# Patient Record
Sex: FEMALE | Race: WHITE | NOT HISPANIC OR LATINO | Employment: OTHER | URBAN - METROPOLITAN AREA
[De-identification: names, ages, dates, MRNs, and addresses within clinical notes are randomized per-mention and may not be internally consistent; named-entity substitution may affect disease eponyms.]

---

## 2023-03-03 LAB — THYROTROPIN (MIU/L) IN SER/PLAS BY DETECTION LIMIT <= 0.05 MIU/L: 0.8 MIU/L (ref 0.44–3.98)

## 2023-04-24 LAB
ALBUMIN (G/DL) IN SER/PLAS: 4.4 G/DL (ref 3.4–5)
ANION GAP IN SER/PLAS: 10 MMOL/L (ref 10–20)
CALCIUM (MG/DL) IN SER/PLAS: 9.7 MG/DL (ref 8.6–10.6)
CARBON DIOXIDE, TOTAL (MMOL/L) IN SER/PLAS: 28 MMOL/L (ref 21–32)
CHLORIDE (MMOL/L) IN SER/PLAS: 106 MMOL/L (ref 98–107)
CREATININE (MG/DL) IN SER/PLAS: 1.24 MG/DL (ref 0.5–1.05)
ERYTHROCYTE DISTRIBUTION WIDTH (RATIO) BY AUTOMATED COUNT: 13.3 % (ref 11.5–14.5)
ERYTHROCYTE MEAN CORPUSCULAR HEMOGLOBIN CONCENTRATION (G/DL) BY AUTOMATED: 30.5 G/DL (ref 32–36)
ERYTHROCYTE MEAN CORPUSCULAR VOLUME (FL) BY AUTOMATED COUNT: 93 FL (ref 80–100)
ERYTHROCYTES (10*6/UL) IN BLOOD BY AUTOMATED COUNT: 4.01 X10E12/L (ref 4–5.2)
GFR FEMALE: 48 ML/MIN/1.73M2
GLUCOSE (MG/DL) IN SER/PLAS: 93 MG/DL (ref 74–99)
HEMATOCRIT (%) IN BLOOD BY AUTOMATED COUNT: 37.1 % (ref 36–46)
HEMOGLOBIN (G/DL) IN BLOOD: 11.3 G/DL (ref 12–16)
LEUKOCYTES (10*3/UL) IN BLOOD BY AUTOMATED COUNT: 5.4 X10E9/L (ref 4.4–11.3)
NRBC (PER 100 WBCS) BY AUTOMATED COUNT: 0 /100 WBC (ref 0–0)
PHOSPHATE (MG/DL) IN SER/PLAS: 3.2 MG/DL (ref 2.5–4.9)
PLATELETS (10*3/UL) IN BLOOD AUTOMATED COUNT: 79 X10E9/L (ref 150–450)
POTASSIUM (MMOL/L) IN SER/PLAS: 5 MMOL/L (ref 3.5–5.3)
SODIUM (MMOL/L) IN SER/PLAS: 139 MMOL/L (ref 136–145)
UREA NITROGEN (MG/DL) IN SER/PLAS: 21 MG/DL (ref 6–23)

## 2023-04-25 LAB — PARATHYRIN INTACT (PG/ML) IN SER/PLAS: 81.6 PG/ML (ref 18.5–88)

## 2023-07-31 LAB
ANION GAP IN SER/PLAS: 11 MMOL/L (ref 10–20)
CALCIUM (MG/DL) IN SER/PLAS: 9.9 MG/DL (ref 8.6–10.6)
CARBON DIOXIDE, TOTAL (MMOL/L) IN SER/PLAS: 28 MMOL/L (ref 21–32)
CHLORIDE (MMOL/L) IN SER/PLAS: 107 MMOL/L (ref 98–107)
CREATININE (MG/DL) IN SER/PLAS: 1.39 MG/DL (ref 0.5–1.05)
GFR FEMALE: 41 ML/MIN/1.73M2
GLUCOSE (MG/DL) IN SER/PLAS: 112 MG/DL (ref 74–99)
POTASSIUM (MMOL/L) IN SER/PLAS: 5.1 MMOL/L (ref 3.5–5.3)
SODIUM (MMOL/L) IN SER/PLAS: 141 MMOL/L (ref 136–145)
UREA NITROGEN (MG/DL) IN SER/PLAS: 21 MG/DL (ref 6–23)

## 2023-09-14 LAB
ALBUMIN (G/DL) IN SER/PLAS: 4.5 G/DL (ref 3.4–5)
ANION GAP IN SER/PLAS: 12 MMOL/L (ref 10–20)
CALCIUM (MG/DL) IN SER/PLAS: 10.3 MG/DL (ref 8.6–10.6)
CARBON DIOXIDE, TOTAL (MMOL/L) IN SER/PLAS: 26 MMOL/L (ref 21–32)
CHLORIDE (MMOL/L) IN SER/PLAS: 108 MMOL/L (ref 98–107)
CREATININE (MG/DL) IN SER/PLAS: 1.26 MG/DL (ref 0.5–1.05)
ERYTHROCYTE DISTRIBUTION WIDTH (RATIO) BY AUTOMATED COUNT: 13.9 % (ref 11.5–14.5)
ERYTHROCYTE MEAN CORPUSCULAR HEMOGLOBIN CONCENTRATION (G/DL) BY AUTOMATED: 31.7 G/DL (ref 32–36)
ERYTHROCYTE MEAN CORPUSCULAR VOLUME (FL) BY AUTOMATED COUNT: 91 FL (ref 80–100)
ERYTHROCYTES (10*6/UL) IN BLOOD BY AUTOMATED COUNT: 4.24 X10E12/L (ref 4–5.2)
GFR FEMALE: 47 ML/MIN/1.73M2
GLUCOSE (MG/DL) IN SER/PLAS: 92 MG/DL (ref 74–99)
HEMATOCRIT (%) IN BLOOD BY AUTOMATED COUNT: 38.5 % (ref 36–46)
HEMOGLOBIN (G/DL) IN BLOOD: 12.2 G/DL (ref 12–16)
LEUKOCYTES (10*3/UL) IN BLOOD BY AUTOMATED COUNT: 5.2 X10E9/L (ref 4.4–11.3)
NRBC (PER 100 WBCS) BY AUTOMATED COUNT: 0 /100 WBC (ref 0–0)
PARATHYRIN INTACT (PG/ML) IN SER/PLAS: 98.2 PG/ML (ref 18.5–88)
PHOSPHATE (MG/DL) IN SER/PLAS: 3.3 MG/DL (ref 2.5–4.9)
PLATELETS (10*3/UL) IN BLOOD AUTOMATED COUNT: 79 X10E9/L (ref 150–450)
POTASSIUM (MMOL/L) IN SER/PLAS: 4.9 MMOL/L (ref 3.5–5.3)
SODIUM (MMOL/L) IN SER/PLAS: 141 MMOL/L (ref 136–145)
UREA NITROGEN (MG/DL) IN SER/PLAS: 21 MG/DL (ref 6–23)

## 2023-09-20 ENCOUNTER — HOSPITAL ENCOUNTER (OUTPATIENT)
Dept: DATA CONVERSION | Facility: HOSPITAL | Age: 68
Discharge: HOME | End: 2023-09-20
Payer: MEDICARE

## 2023-09-20 DIAGNOSIS — N20.0 CALCULUS OF KIDNEY: ICD-10-CM

## 2023-09-21 PROBLEM — I10 PRIMARY HYPERTENSION: Status: ACTIVE | Noted: 2023-09-21

## 2023-09-21 PROBLEM — J96.00 ACUTE RESPIRATORY FAILURE (MULTI): Status: ACTIVE | Noted: 2023-09-21

## 2023-09-21 PROBLEM — E78.5 HYPERLIPIDEMIA: Status: ACTIVE | Noted: 2023-09-21

## 2023-09-21 PROBLEM — K74.60 CIRRHOSIS OF LIVER (MULTI): Status: ACTIVE | Noted: 2023-09-21

## 2023-09-21 PROBLEM — N17.9 ACUTE RENAL FAILURE SYNDROME (CMS-HCC): Status: ACTIVE | Noted: 2023-09-21

## 2023-09-21 PROBLEM — H02.834 DERMATOCHALASIS OF LEFT UPPER EYELID: Status: ACTIVE | Noted: 2023-09-21

## 2023-09-21 PROBLEM — R10.11 RIGHT UPPER QUADRANT PAIN: Status: ACTIVE | Noted: 2023-09-21

## 2023-09-21 PROBLEM — M70.61 GREATER TROCHANTERIC BURSITIS OF RIGHT HIP: Status: ACTIVE | Noted: 2023-09-21

## 2023-09-21 PROBLEM — E11.42 DIABETIC POLYNEUROPATHY ASSOCIATED WITH TYPE 2 DIABETES MELLITUS (MULTI): Status: ACTIVE | Noted: 2023-09-21

## 2023-09-21 PROBLEM — Z96.1 ARTIFICIAL LENS PRESENT: Status: ACTIVE | Noted: 2023-09-21

## 2023-09-21 PROBLEM — M54.41 LUMBAGO WITH SCIATICA, RIGHT SIDE: Status: ACTIVE | Noted: 2023-09-21

## 2023-09-21 PROBLEM — G47.33 OBSTRUCTIVE SLEEP APNEA SYNDROME: Status: ACTIVE | Noted: 2023-09-21

## 2023-09-21 PROBLEM — R07.9 CHEST PAIN: Status: ACTIVE | Noted: 2023-09-21

## 2023-09-21 PROBLEM — M51.369 DDD (DEGENERATIVE DISC DISEASE), LUMBAR: Status: ACTIVE | Noted: 2023-09-21

## 2023-09-21 PROBLEM — J18.9 PNEUMONIA: Status: ACTIVE | Noted: 2023-09-21

## 2023-09-21 PROBLEM — G89.29 OTHER CHRONIC PAIN: Status: ACTIVE | Noted: 2023-09-21

## 2023-09-21 PROBLEM — R16.0 HEPATOMEGALY: Status: ACTIVE | Noted: 2023-09-21

## 2023-09-21 PROBLEM — D64.9 ABSOLUTE ANEMIA: Status: ACTIVE | Noted: 2023-09-21

## 2023-09-21 PROBLEM — E11.9 ABNORMAL METABOLIC STATE DUE TO DIABETES MELLITUS (MULTI): Status: ACTIVE | Noted: 2023-09-21

## 2023-09-21 PROBLEM — J90 BILATERAL PLEURAL EFFUSION: Status: ACTIVE | Noted: 2023-09-21

## 2023-09-21 PROBLEM — R87.810 CERVICAL HIGH RISK HUMAN PAPILLOMAVIRUS (HPV) DNA TEST POSITIVE: Status: ACTIVE | Noted: 2023-09-21

## 2023-09-21 PROBLEM — G47.34 NOCTURNAL HYPOXIA: Status: ACTIVE | Noted: 2023-09-21

## 2023-09-21 PROBLEM — R79.89 ABNORMAL LFTS: Status: ACTIVE | Noted: 2023-09-21

## 2023-09-21 PROBLEM — E87.5 HYPERKALEMIA: Status: ACTIVE | Noted: 2023-09-21

## 2023-09-21 PROBLEM — J86.9: Status: ACTIVE | Noted: 2023-09-21

## 2023-09-21 PROBLEM — R06.09 DYSPNEA ON MINIMAL EXERTION: Status: ACTIVE | Noted: 2023-09-21

## 2023-09-21 PROBLEM — K81.1 CHRONIC CHOLECYSTITIS: Status: ACTIVE | Noted: 2023-09-21

## 2023-09-21 PROBLEM — M51.36 DDD (DEGENERATIVE DISC DISEASE), LUMBAR: Status: ACTIVE | Noted: 2023-09-21

## 2023-09-21 PROBLEM — A41.9 SEPSIS (MULTI): Status: ACTIVE | Noted: 2023-09-21

## 2023-09-21 PROBLEM — K11.5 CALCULUS OF PAROTID GLAND: Status: ACTIVE | Noted: 2023-09-21

## 2023-09-21 PROBLEM — E11.40 TYPE 2 DIABETES MELLITUS WITH DIABETIC NEUROPATHY, UNSPECIFIED (MULTI): Status: ACTIVE | Noted: 2023-09-21

## 2023-09-21 PROBLEM — E87.6 HYPOKALEMIA: Status: ACTIVE | Noted: 2023-09-21

## 2023-09-21 PROBLEM — I69.398 OTHER SEQUELAE OF CEREBRAL INFARCTION: Status: ACTIVE | Noted: 2023-09-21

## 2023-09-21 PROBLEM — M54.16 RADICULOPATHY, LUMBAR REGION: Status: ACTIVE | Noted: 2023-09-21

## 2023-09-21 PROBLEM — R20.2 PARESTHESIA: Status: ACTIVE | Noted: 2023-09-21

## 2023-09-21 PROBLEM — Z91.89 AT RISK FOR INJURY RELATED TO RESTRAINTS: Status: ACTIVE | Noted: 2023-09-21

## 2023-09-21 PROBLEM — H02.831 DERMATOCHALASIS OF RIGHT UPPER EYELID: Status: ACTIVE | Noted: 2023-09-21

## 2023-09-21 PROBLEM — E11.9 DIABETES MELLITUS (MULTI): Status: ACTIVE | Noted: 2023-09-21

## 2023-09-21 PROBLEM — R77.9 ELEVATED BLOOD PROTEIN: Status: ACTIVE | Noted: 2023-09-21

## 2023-09-21 PROBLEM — R14.0 ABDOMINAL DISTENTION: Status: ACTIVE | Noted: 2023-09-21

## 2023-09-21 PROBLEM — D47.3 ESSENTIAL THROMBOCYTHEMIA (MULTI): Status: ACTIVE | Noted: 2023-09-21

## 2023-09-21 PROBLEM — M70.71 ISCHIAL BURSITIS OF RIGHT SIDE: Status: ACTIVE | Noted: 2023-09-21

## 2023-09-21 PROBLEM — E03.9 HYPOTHYROIDISM: Status: ACTIVE | Noted: 2023-09-21

## 2023-09-21 PROBLEM — Z78.9 MEDICAL HOME PATIENT: Status: ACTIVE | Noted: 2023-09-21

## 2023-09-21 PROBLEM — G57.11 MERALGIA PARESTHETICA OF RIGHT SIDE: Status: ACTIVE | Noted: 2023-09-21

## 2023-09-21 PROBLEM — R21 MACULOPAPULAR RASH: Status: ACTIVE | Noted: 2023-09-21

## 2023-09-21 PROBLEM — E87.20 LACTIC ACIDOSIS: Status: ACTIVE | Noted: 2023-09-21

## 2023-09-21 PROBLEM — E83.52 HYPERCALCEMIA: Status: ACTIVE | Noted: 2023-09-21

## 2023-09-21 PROBLEM — R40.1 CLOUDED CONSCIOUSNESS: Status: ACTIVE | Noted: 2023-09-21

## 2023-09-21 PROBLEM — G62.9 NEUROPATHY: Status: ACTIVE | Noted: 2023-09-21

## 2023-09-21 PROBLEM — M79.18 MYOFASCIAL PAIN: Status: ACTIVE | Noted: 2023-09-21

## 2023-09-21 PROBLEM — I85.00 ESOPHAGEAL VARICES (MULTI): Status: ACTIVE | Noted: 2023-09-21

## 2023-09-21 PROBLEM — F17.211 CIGARETTE NICOTINE DEPENDENCE IN REMISSION: Status: ACTIVE | Noted: 2023-09-21

## 2023-09-21 PROBLEM — G47.00 INSOMNIA: Status: ACTIVE | Noted: 2023-09-21

## 2023-09-21 PROBLEM — R19.7 DIARRHEA: Status: ACTIVE | Noted: 2023-09-21

## 2023-09-21 PROBLEM — G43.109 BASILAR MIGRAINE: Status: ACTIVE | Noted: 2023-09-21

## 2023-09-21 PROBLEM — E87.20 ACIDOSIS: Status: ACTIVE | Noted: 2023-09-21

## 2023-09-21 PROBLEM — E87.0 HYPERNATREMIA: Status: ACTIVE | Noted: 2023-09-21

## 2023-09-21 PROBLEM — K76.0 NAFLD (NONALCOHOLIC FATTY LIVER DISEASE): Status: ACTIVE | Noted: 2023-09-21

## 2023-09-21 PROBLEM — E55.9 VITAMIN D DEFICIENCY: Status: ACTIVE | Noted: 2023-09-21

## 2023-09-21 PROBLEM — D69.6 LOW PLATELET COUNT (CMS-HCC): Status: ACTIVE | Noted: 2023-09-21

## 2023-09-21 PROBLEM — H35.3190 NONEXUDATIVE AGE-RELATED MACULAR DEGENERATION: Status: ACTIVE | Noted: 2023-09-21

## 2023-09-21 RX ORDER — LOSARTAN POTASSIUM 100 MG/1
100 TABLET ORAL DAILY
COMMUNITY
End: 2023-10-19

## 2023-09-21 RX ORDER — LEVOTHYROXINE SODIUM 175 UG/1
1 TABLET ORAL DAILY
COMMUNITY
End: 2024-05-21 | Stop reason: DRUGHIGH

## 2023-09-21 RX ORDER — METFORMIN HYDROCHLORIDE 500 MG/1
500 TABLET, EXTENDED RELEASE ORAL
COMMUNITY
Start: 2022-05-02 | End: 2024-02-23 | Stop reason: SDUPTHER

## 2023-09-21 RX ORDER — DEXTROMETHORPHAN HYDROBROMIDE, GUAIFENESIN 5; 100 MG/5ML; MG/5ML
1300 LIQUID ORAL EVERY 8 HOURS PRN
COMMUNITY
End: 2024-02-23 | Stop reason: WASHOUT

## 2023-09-21 RX ORDER — CALCIUM CARBONATE/VITAMIN D3 600 MG-10
TABLET ORAL
COMMUNITY
Start: 2021-10-21

## 2023-09-21 RX ORDER — BLOOD-GLUCOSE METER
KIT MISCELLANEOUS
COMMUNITY
Start: 2023-07-28

## 2023-09-21 RX ORDER — LEVOTHYROXINE SODIUM 112 UG/1
112 TABLET ORAL
COMMUNITY
End: 2024-02-23 | Stop reason: WASHOUT

## 2023-09-21 RX ORDER — LEVOTHYROXINE SODIUM 125 UG/1
TABLET ORAL
COMMUNITY
Start: 2022-12-25 | End: 2024-02-23 | Stop reason: WASHOUT

## 2023-09-21 RX ORDER — ZINC GLUCONATE 50 MG
50 TABLET ORAL DAILY
COMMUNITY

## 2023-09-21 RX ORDER — FLUTICASONE FUROATE, UMECLIDINIUM BROMIDE AND VILANTEROL TRIFENATATE 100; 62.5; 25 UG/1; UG/1; UG/1
1 POWDER RESPIRATORY (INHALATION) DAILY
COMMUNITY
Start: 2022-12-08 | End: 2024-05-14 | Stop reason: WASHOUT

## 2023-09-21 RX ORDER — ZINC SULFATE 50(220)MG
50 CAPSULE ORAL
COMMUNITY
Start: 2022-01-27

## 2023-09-21 RX ORDER — ACETAMINOPHEN 500 MG
50 TABLET ORAL DAILY
COMMUNITY

## 2023-09-21 RX ORDER — VIT C/E/ZN/COPPR/LUTEIN/ZEAXAN 250MG-90MG
CAPSULE ORAL
COMMUNITY
End: 2024-05-14 | Stop reason: WASHOUT

## 2023-09-21 RX ORDER — ACETAMINOPHEN 500 MG
500 TABLET ORAL EVERY 6 HOURS PRN
COMMUNITY

## 2023-09-21 RX ORDER — ACETAMINOPHEN 325 MG/1
650 TABLET ORAL EVERY 4 HOURS PRN
COMMUNITY
End: 2024-02-23 | Stop reason: WASHOUT

## 2023-09-21 RX ORDER — LINAGLIPTIN 5 MG/1
5 TABLET, FILM COATED ORAL DAILY
COMMUNITY

## 2023-09-21 RX ORDER — PANTOPRAZOLE SODIUM 40 MG/1
1 TABLET, DELAYED RELEASE ORAL DAILY
COMMUNITY
Start: 2021-11-19 | End: 2024-05-14 | Stop reason: WASHOUT

## 2023-09-21 RX ORDER — GABAPENTIN 400 MG/1
400 CAPSULE ORAL 3 TIMES DAILY
COMMUNITY

## 2023-09-21 RX ORDER — LOSARTAN POTASSIUM 50 MG/1
50 TABLET ORAL DAILY
COMMUNITY
Start: 2021-03-23 | End: 2023-10-19 | Stop reason: SDUPTHER

## 2023-09-21 RX ORDER — NADOLOL 40 MG/1
40 TABLET ORAL DAILY
COMMUNITY
Start: 2018-05-17 | End: 2024-02-23 | Stop reason: SDUPTHER

## 2023-09-21 RX ORDER — LEVOTHYROXINE SODIUM 137 UG/1
137 TABLET ORAL
COMMUNITY
End: 2024-02-15

## 2023-09-21 RX ORDER — ALBUTEROL SULFATE 90 UG/1
1 AEROSOL, METERED RESPIRATORY (INHALATION) EVERY 4 HOURS PRN
COMMUNITY
Start: 2021-11-01 | End: 2024-04-23 | Stop reason: SDUPTHER

## 2023-09-21 RX ORDER — LANOLIN ALCOHOL/MO/W.PET/CERES
1000 CREAM (GRAM) TOPICAL DAILY
COMMUNITY

## 2023-09-21 RX ORDER — METFORMIN HYDROCHLORIDE 1000 MG/1
1 TABLET ORAL EVERY 12 HOURS
COMMUNITY
End: 2024-02-23 | Stop reason: WASHOUT

## 2023-09-21 RX ORDER — ATORVASTATIN CALCIUM 40 MG/1
40 TABLET, FILM COATED ORAL DAILY
COMMUNITY
End: 2024-02-23 | Stop reason: SDUPTHER

## 2023-09-21 RX ORDER — ATORVASTATIN CALCIUM 20 MG/1
20 TABLET, FILM COATED ORAL DAILY
COMMUNITY
End: 2024-02-23 | Stop reason: ALTCHOICE

## 2023-09-29 ENCOUNTER — APPOINTMENT (OUTPATIENT)
Dept: OPHTHALMOLOGY | Facility: CLINIC | Age: 68
End: 2023-09-29
Payer: MEDICARE

## 2023-10-19 DIAGNOSIS — I10 PRIMARY HYPERTENSION: ICD-10-CM

## 2023-10-19 RX ORDER — LOSARTAN POTASSIUM 100 MG/1
100 TABLET ORAL DAILY
Qty: 90 TABLET | Refills: 1 | Status: SHIPPED | OUTPATIENT
Start: 2023-10-19 | End: 2024-05-01

## 2024-02-15 DIAGNOSIS — Z76.0 PRESCRIPTION REFILL: ICD-10-CM

## 2024-02-15 RX ORDER — LEVOTHYROXINE SODIUM 137 UG/1
137 TABLET ORAL
Qty: 90 TABLET | Refills: 1 | Status: SHIPPED | OUTPATIENT
Start: 2024-02-15 | End: 2024-05-14 | Stop reason: WASHOUT

## 2024-02-23 DIAGNOSIS — Z76.0 MEDICATION REFILL: ICD-10-CM

## 2024-02-23 RX ORDER — ATORVASTATIN CALCIUM 40 MG/1
40 TABLET, FILM COATED ORAL DAILY
Qty: 90 TABLET | Refills: 1 | Status: SHIPPED | OUTPATIENT
Start: 2024-02-23

## 2024-02-23 RX ORDER — METFORMIN HYDROCHLORIDE 500 MG/1
500 TABLET, EXTENDED RELEASE ORAL
Qty: 180 TABLET | Refills: 1 | Status: SHIPPED | OUTPATIENT
Start: 2024-02-23

## 2024-02-23 RX ORDER — NADOLOL 40 MG/1
40 TABLET ORAL DAILY
Qty: 90 TABLET | Refills: 1 | Status: SHIPPED | OUTPATIENT
Start: 2024-02-23

## 2024-03-15 ENCOUNTER — LAB (OUTPATIENT)
Dept: LAB | Facility: LAB | Age: 69
End: 2024-03-15
Payer: MEDICARE

## 2024-03-15 DIAGNOSIS — N18.30 CHRONIC KIDNEY DISEASE, STAGE 3 UNSPECIFIED (MULTI): Primary | ICD-10-CM

## 2024-03-15 LAB
ALBUMIN SERPL BCP-MCNC: 4.4 G/DL (ref 3.4–5)
ANION GAP SERPL CALC-SCNC: 12 MMOL/L (ref 10–20)
BUN SERPL-MCNC: 22 MG/DL (ref 6–23)
CALCIUM SERPL-MCNC: 9.2 MG/DL (ref 8.6–10.6)
CHLORIDE SERPL-SCNC: 104 MMOL/L (ref 98–107)
CO2 SERPL-SCNC: 27 MMOL/L (ref 21–32)
CREAT SERPL-MCNC: 1.38 MG/DL (ref 0.5–1.05)
EGFRCR SERPLBLD CKD-EPI 2021: 42 ML/MIN/1.73M*2
ERYTHROCYTE [DISTWIDTH] IN BLOOD BY AUTOMATED COUNT: 14.1 % (ref 11.5–14.5)
GLUCOSE SERPL-MCNC: 297 MG/DL (ref 74–99)
HCT VFR BLD AUTO: 35.4 % (ref 36–46)
HGB BLD-MCNC: 11 G/DL (ref 12–16)
MCH RBC QN AUTO: 29.2 PG (ref 26–34)
MCHC RBC AUTO-ENTMCNC: 31.1 G/DL (ref 32–36)
MCV RBC AUTO: 94 FL (ref 80–100)
NRBC BLD-RTO: 0 /100 WBCS (ref 0–0)
PHOSPHATE SERPL-MCNC: 2.3 MG/DL (ref 2.5–4.9)
PLATELET # BLD AUTO: 63 X10*3/UL (ref 150–450)
POTASSIUM SERPL-SCNC: 4.9 MMOL/L (ref 3.5–5.3)
PTH-INTACT SERPL-MCNC: 265.5 PG/ML (ref 18.5–88)
RBC # BLD AUTO: 3.77 X10*6/UL (ref 4–5.2)
SODIUM SERPL-SCNC: 138 MMOL/L (ref 136–145)
WBC # BLD AUTO: 4.2 X10*3/UL (ref 4.4–11.3)

## 2024-03-15 PROCEDURE — 36415 COLL VENOUS BLD VENIPUNCTURE: CPT

## 2024-03-15 PROCEDURE — 85027 COMPLETE CBC AUTOMATED: CPT

## 2024-03-15 PROCEDURE — 83970 ASSAY OF PARATHORMONE: CPT

## 2024-03-15 PROCEDURE — 80069 RENAL FUNCTION PANEL: CPT

## 2024-04-09 ENCOUNTER — HOSPITAL ENCOUNTER (OUTPATIENT)
Dept: RADIOLOGY | Facility: HOSPITAL | Age: 69
Discharge: HOME | End: 2024-04-09
Payer: MEDICARE

## 2024-04-09 DIAGNOSIS — N20.0 CALCULUS OF KIDNEY: ICD-10-CM

## 2024-04-09 PROCEDURE — 74018 RADEX ABDOMEN 1 VIEW: CPT | Performed by: RADIOLOGY

## 2024-04-09 PROCEDURE — 74018 RADEX ABDOMEN 1 VIEW: CPT

## 2024-04-23 DIAGNOSIS — Z76.0 MEDICATION REFILL: ICD-10-CM

## 2024-04-23 RX ORDER — ALBUTEROL SULFATE 90 UG/1
1 AEROSOL, METERED RESPIRATORY (INHALATION) EVERY 4 HOURS PRN
Qty: 18 G | Refills: 1 | Status: SHIPPED | OUTPATIENT
Start: 2024-04-23

## 2024-04-30 DIAGNOSIS — I10 PRIMARY HYPERTENSION: ICD-10-CM

## 2024-05-01 RX ORDER — LOSARTAN POTASSIUM 100 MG/1
100 TABLET ORAL DAILY
Qty: 90 TABLET | Refills: 0 | Status: SHIPPED | OUTPATIENT
Start: 2024-05-01

## 2024-05-14 ENCOUNTER — OFFICE VISIT (OUTPATIENT)
Dept: PRIMARY CARE | Facility: CLINIC | Age: 69
End: 2024-05-14
Payer: MEDICARE

## 2024-05-14 VITALS
SYSTOLIC BLOOD PRESSURE: 124 MMHG | OXYGEN SATURATION: 97 % | BODY MASS INDEX: 29.26 KG/M2 | TEMPERATURE: 97.7 F | DIASTOLIC BLOOD PRESSURE: 80 MMHG | HEIGHT: 62 IN | HEART RATE: 65 BPM | WEIGHT: 159 LBS

## 2024-05-14 DIAGNOSIS — Z12.31 ENCOUNTER FOR SCREENING MAMMOGRAM FOR MALIGNANT NEOPLASM OF BREAST: ICD-10-CM

## 2024-05-14 DIAGNOSIS — Z23 ENCOUNTER FOR IMMUNIZATION: ICD-10-CM

## 2024-05-14 DIAGNOSIS — Z13.6 SCREENING FOR CARDIOVASCULAR CONDITION: ICD-10-CM

## 2024-05-14 DIAGNOSIS — Z78.0 ASYMPTOMATIC MENOPAUSAL STATE: ICD-10-CM

## 2024-05-14 DIAGNOSIS — R87.610 ASCUS WITH POSITIVE HIGH RISK HPV CERVICAL: ICD-10-CM

## 2024-05-14 DIAGNOSIS — Z00.00 ANNUAL PHYSICAL EXAM: ICD-10-CM

## 2024-05-14 DIAGNOSIS — G47.00 FREQUENT NOCTURNAL AWAKENING: ICD-10-CM

## 2024-05-14 DIAGNOSIS — G47.00 INSOMNIA, UNSPECIFIED TYPE: ICD-10-CM

## 2024-05-14 DIAGNOSIS — Z00.00 MEDICARE ANNUAL WELLNESS VISIT, SUBSEQUENT: Primary | ICD-10-CM

## 2024-05-14 DIAGNOSIS — E55.9 VITAMIN D DEFICIENCY: ICD-10-CM

## 2024-05-14 DIAGNOSIS — R87.810 ASCUS WITH POSITIVE HIGH RISK HPV CERVICAL: ICD-10-CM

## 2024-05-14 DIAGNOSIS — E11.40 TYPE 2 DIABETES MELLITUS WITH DIABETIC NEUROPATHY, WITHOUT LONG-TERM CURRENT USE OF INSULIN (MULTI): ICD-10-CM

## 2024-05-14 DIAGNOSIS — G47.33 OBSTRUCTIVE SLEEP APNEA SYNDROME: ICD-10-CM

## 2024-05-14 DIAGNOSIS — Z87.891 PERSONAL HISTORY OF NICOTINE DEPENDENCE: ICD-10-CM

## 2024-05-14 DIAGNOSIS — Z12.11 ENCOUNTER FOR SCREENING FOR MALIGNANT NEOPLASM OF COLON: ICD-10-CM

## 2024-05-14 PROCEDURE — 1160F RVW MEDS BY RX/DR IN RCRD: CPT | Performed by: STUDENT IN AN ORGANIZED HEALTH CARE EDUCATION/TRAINING PROGRAM

## 2024-05-14 PROCEDURE — 1124F ACP DISCUSS-NO DSCNMKR DOCD: CPT | Performed by: STUDENT IN AN ORGANIZED HEALTH CARE EDUCATION/TRAINING PROGRAM

## 2024-05-14 PROCEDURE — 90715 TDAP VACCINE 7 YRS/> IM: CPT | Performed by: STUDENT IN AN ORGANIZED HEALTH CARE EDUCATION/TRAINING PROGRAM

## 2024-05-14 PROCEDURE — 3074F SYST BP LT 130 MM HG: CPT | Performed by: STUDENT IN AN ORGANIZED HEALTH CARE EDUCATION/TRAINING PROGRAM

## 2024-05-14 PROCEDURE — G0009 ADMIN PNEUMOCOCCAL VACCINE: HCPCS | Mod: 59 | Performed by: STUDENT IN AN ORGANIZED HEALTH CARE EDUCATION/TRAINING PROGRAM

## 2024-05-14 PROCEDURE — 3079F DIAST BP 80-89 MM HG: CPT | Performed by: STUDENT IN AN ORGANIZED HEALTH CARE EDUCATION/TRAINING PROGRAM

## 2024-05-14 PROCEDURE — 99215 OFFICE O/P EST HI 40 MIN: CPT | Performed by: STUDENT IN AN ORGANIZED HEALTH CARE EDUCATION/TRAINING PROGRAM

## 2024-05-14 PROCEDURE — 99214 OFFICE O/P EST MOD 30 MIN: CPT | Performed by: STUDENT IN AN ORGANIZED HEALTH CARE EDUCATION/TRAINING PROGRAM

## 2024-05-14 PROCEDURE — 1125F AMNT PAIN NOTED PAIN PRSNT: CPT | Performed by: STUDENT IN AN ORGANIZED HEALTH CARE EDUCATION/TRAINING PROGRAM

## 2024-05-14 PROCEDURE — 99397 PER PM REEVAL EST PAT 65+ YR: CPT | Performed by: STUDENT IN AN ORGANIZED HEALTH CARE EDUCATION/TRAINING PROGRAM

## 2024-05-14 PROCEDURE — 4010F ACE/ARB THERAPY RXD/TAKEN: CPT | Performed by: STUDENT IN AN ORGANIZED HEALTH CARE EDUCATION/TRAINING PROGRAM

## 2024-05-14 PROCEDURE — 1159F MED LIST DOCD IN RCRD: CPT | Performed by: STUDENT IN AN ORGANIZED HEALTH CARE EDUCATION/TRAINING PROGRAM

## 2024-05-14 PROCEDURE — G0439 PPPS, SUBSEQ VISIT: HCPCS | Performed by: STUDENT IN AN ORGANIZED HEALTH CARE EDUCATION/TRAINING PROGRAM

## 2024-05-14 ASSESSMENT — ANXIETY QUESTIONNAIRES
4. TROUBLE RELAXING: SEVERAL DAYS
IF YOU CHECKED OFF ANY PROBLEMS ON THIS QUESTIONNAIRE, HOW DIFFICULT HAVE THESE PROBLEMS MADE IT FOR YOU TO DO YOUR WORK, TAKE CARE OF THINGS AT HOME, OR GET ALONG WITH OTHER PEOPLE: NOT DIFFICULT AT ALL
3. WORRYING TOO MUCH ABOUT DIFFERENT THINGS: NOT AT ALL
2. NOT BEING ABLE TO STOP OR CONTROL WORRYING: NOT AT ALL
GAD7 TOTAL SCORE: 1
7. FEELING AFRAID AS IF SOMETHING AWFUL MIGHT HAPPEN: NOT AT ALL
1. FEELING NERVOUS, ANXIOUS, OR ON EDGE: NOT AT ALL
6. BECOMING EASILY ANNOYED OR IRRITABLE: NOT AT ALL
5. BEING SO RESTLESS THAT IT IS HARD TO SIT STILL: NOT AT ALL

## 2024-05-14 ASSESSMENT — PATIENT HEALTH QUESTIONNAIRE - PHQ9
9. THOUGHTS THAT YOU WOULD BE BETTER OFF DEAD, OR OF HURTING YOURSELF: NOT AT ALL
10. IF YOU CHECKED OFF ANY PROBLEMS, HOW DIFFICULT HAVE THESE PROBLEMS MADE IT FOR YOU TO DO YOUR WORK, TAKE CARE OF THINGS AT HOME, OR GET ALONG WITH OTHER PEOPLE: NOT DIFFICULT AT ALL
SUM OF ALL RESPONSES TO PHQ QUESTIONS 1-9: 8
5. POOR APPETITE OR OVEREATING: NOT AT ALL
SUM OF ALL RESPONSES TO PHQ9 QUESTIONS 1 AND 2: 0
1. LITTLE INTEREST OR PLEASURE IN DOING THINGS: NOT AT ALL
3. TROUBLE FALLING OR STAYING ASLEEP OR SLEEPING TOO MUCH: NEARLY EVERY DAY
6. FEELING BAD ABOUT YOURSELF - OR THAT YOU ARE A FAILURE OR HAVE LET YOURSELF OR YOUR FAMILY DOWN: NOT AT ALL
7. TROUBLE CONCENTRATING ON THINGS, SUCH AS READING THE NEWSPAPER OR WATCHING TELEVISION: SEVERAL DAYS
8. MOVING OR SPEAKING SO SLOWLY THAT OTHER PEOPLE COULD HAVE NOTICED. OR THE OPPOSITE, BEING SO FIGETY OR RESTLESS THAT YOU HAVE BEEN MOVING AROUND A LOT MORE THAN USUAL: SEVERAL DAYS
4. FEELING TIRED OR HAVING LITTLE ENERGY: NEARLY EVERY DAY
2. FEELING DOWN, DEPRESSED OR HOPELESS: NOT AT ALL

## 2024-05-14 ASSESSMENT — PAIN SCALES - GENERAL: PAINLEVEL: 2

## 2024-05-14 NOTE — PATIENT INSTRUCTIONS
Thank you for coming to see me today.    Pneumonia and tetanus vaccinations in clinic today.    Go to the lab for fasting blood work, we will call you with all results.    Multiple imaging studies ordered today: Mammogram, DEXA scan, CT lung screening-call to schedule.    Sleep medicine referral entered for reassessment of your known sleep apnea, call to schedule.  Try taking melatonin 4 hours before planned bedtime as we discussed.    GI referral for Dr. Villegas entered today for repeat colonoscopy, call to schedule.    Follow-up with me in 3 months for diabetes check, sooner if needed.

## 2024-05-14 NOTE — PROGRESS NOTES
MEDICARE WELLNESS    Chief Complaint   Patient presents with    Annual Exam       HPI:  Kisha Vincent is a 68 y.o. female here  for a a Medicare Wellness Visit.    Hypothyroidism:    Lab Results   Component Value Date    TSH 1.12 07/21/2023         DM2:  Metformin 500 mg BID.  No missed dosages  Statin: atorvastatin 40 mg daily  Lab Results   Component Value Date    HGBA1C 5.7 11/06/2021     JAYNE:  Last sleep study over 10 years ago.  Was on machine and choking due to the night.  Difficulty falling asleep  Has tried OTC melatonin and other sleep    Former smoker  1ppd+ for over 40 years    Health Maintenance    Other Healthcare Providers:  Nephrology:  Magan Kendrick MD  Ophthalmology:  Gualberto Gra MD    Social History:  Tobacco:  quit 2013, 1 ppd for 40 yrs  EtOH:  no  Patient reports routine vision checks and dental cleanings, and regular exercise.     Advanced Directives:  Patient DOES NOT have advanced directives in place.  Counseled and discussed importance with patient during visit today.  Provided assistance as necessary.    Opioid Medications:  Does the patient use opioid medications: NO  Name of medication: N/A  If yes, do they take this medicine appropriately: N/A    Overall Health:  How does the patient rate their health status today:  GOOD    Cognitive Screen:  AAAx3  to person, place and time: YES  3 word recall: Banana, Chair, Sunrise  - Immediate recall: YES  - 5 minutes recall: YES  Impression: No cognitive deficiency observed during screening or encounter today     Vision/Hearing Screen:  Vision :  No acute concerns  Hearing screen: reports no difficulty with hearing and passes finger rub test bilaterally    Immunizations:  COVID:  DUE  Flu:  Due next flu season  Tdap: DUE  Pneumonia:  DUE  Shingrix:  DUE    Immunization History   Administered Date(s) Administered    Flu vaccine, quadrivalent, high-dose, preservative free, age 65y+ (FLUZONE) 10/16/2020, 11/21/2022    Influenza, Unspecified  11/21/2022    Pneumococcal conjugate vaccine, 20-valent (PREVNAR 20) 05/14/2024    Tdap vaccine, age 7 year and older (BOOSTRIX, ADACEL) 05/14/2024       Screenings:  HCV:  negative 2/15/2018 - utd  Pap:  10/25/2019 ASCUS, HPV positive, will schedule appt with me  Mammogram:  2/4/2023 wnl - DUE  Colonoscopy:  1/17/2018 ([facility]) - 3 broad based sessile polyps (path:  tubular adenoma x2, hyperplastic polyp x1) - DUE  DEXA:  DUE    Screening Pap due 21-65, Q3, Q5 with nml HPV after 31 y/o  Screening Mammogram :  yearly ages 40-75, shared decision making age >75  Screening Colonoscopy:  age 45-75, shared decision making age >75  DEXA scan 65 or 60 with risks, [id number] after  AAA screen men 65-75 men with ANY smoking history  Low dose CT of lungs:  yearly for 50-80 with at least 20 year pack history.  Current smokers and those who quit <15 years ago.  Coronary Ca score men >45, women >55,   Hep C screen:  one time all adults age 18-79        Reviewed:   Past Medical History/Allergies:  Yes  Family History:  Yes  Social History:  Yes  Current Medications:  Yes  Vital Signs:  Yes  Advanced Directives:  discussed  Immunizations:  reviewed today  Home Safety:                    Up & Go test > 30 seconds?  No                   Home have rugs; lack grab bars in bathroom; lack handrail on stairs; have poor lighting?  No                   Hearing difficulties?  No  Geriatric Assessment           ADL areas requiring assistance:  Does not need help with , Dressing, Eating, Ambulating, Toileting, Grooming, Hygiene.            IADL areas requiring assistance:  Does not need help with , Shopping, Housework, Accounting, Transportation, Driving.   Medications reviewed           Current supplements  Reviewed and recorded.   Other providers           Reviewed and recorded  Current providers and suppliers:     PHQ9/GAD7:  Over the past 2 weeks, how often have you been bothered by any of the following problems?  Trouble falling or staying  asleep, or sleeping too much: Nearly every day  Feeling tired or having little energy: Nearly every day  Poor appetite or overeating: Not at all  Feeling bad about yourself - or that you are a failure or have let yourself or your family down: Not at all  Trouble concentrating on things, such as reading the newspaper or watching television: Several days  Moving or speaking so slowly that other people could have noticed? Or the opposite - being so fidgety or restless that you have been moving around a lot more than usual.: Several days  Thoughts that you would be better off dead or hurting yourself in some way: Not at all  Patient Health Questionnaire-9 Score: 8  Over the last 2 weeks, how often have you been bothered by any of the following problems?  Feeling nervous, anxious, or on edge: Not at all  Not being able to stop or control worrying: Not at all  Worrying too much about different things: Not at all  Trouble relaxing: Several days  Being so restless that it is hard to sit still: Not at all  Becoming easily annoyed or irritable: Not at all  Feeling afraid as if something awful might happen: Not at all  RUDDY-7 Total Score: 1      Current Medications  Current Outpatient Medications   Medication Instructions    acetaminophen (TYLENOL) 500 mg, oral, Every 6 hours PRN    albuterol 90 mcg/actuation inhaler 1 puff, inhalation, Every 4 hours PRN    atorvastatin (LIPITOR) 40 mg, oral, Daily    cholecalciferol (VITAMIN D-3) 50 mcg, oral, Daily    cyanocobalamin (VITAMIN B-12) 1,000 mcg, oral, Daily    FreeStyle Lite Strips strip FREESTYLE LT STRIPS HEATHER    gabapentin (NEURONTIN) 400 mg, oral, 3 times daily    levothyroxine (Synthroid) 175 mcg tablet 1 tablet, oral, Daily    losartan (COZAAR) 100 mg, oral, Daily    metFORMIN XR (GLUCOPHAGE-XR) 500 mg, oral, 2 times daily (morning and late afternoon)    nadolol (CORGARD) 40 mg, oral, Daily    NON FORMULARY CHLORZAOXADONE MUSCLE RELAXER    omega-3 fatty acids-fish oil  (One-Per-Day Omega-3) 684-1,200 mg capsule oral,  Omega 3 1200 MG Oral Capsule    Tradjenta 5 mg, oral, Daily    zinc gluconate 50 mg tablet 50 mg of elemental zinc, oral, Daily    zinc sulfate (Zincate) 220 (50 Zn) MG capsule 50 mg of elemental zinc, oral,  Zinc 220 (50 Zn) MG Oral Capsule        History  Allergies   Allergen Reactions    Latex, Natural Rubber Other and Unknown     sensitivity    Amoxicillin Rash    Ampicillin Hives and Rash    Cefazolin Hives and Rash      Past Medical History:   Diagnosis Date    Abdominal distension (gaseous) 2020    Abdominal distention    Osteopetrosis (HHS-HCC)     Osteopetrosis    Personal history of other diseases of the nervous system and sense organs     History of neuropathy    Personal history of other diseases of the nervous system and sense organs     History of sleep apnea    Personal history of other endocrine, nutritional and metabolic disease     History of hypothyroidism    Personal history of other endocrine, nutritional and metabolic disease     History of type 2 diabetes mellitus    Personal history of other infectious and parasitic diseases     History of sepsis    Thrombocytopenia, unspecified (CMS-HCC)     Temporary low platelet count      Past Surgical History:   Procedure Laterality Date    CATARACT EXTRACTION  02/15/2018    Cataract Surgery     SECTION, CLASSIC  02/15/2018     Section    OTHER SURGICAL HISTORY  2020    Cervical biopsy procedure colposcopic    TUBAL LIGATION  02/15/2018    Tubal Ligation    US GUIDED PERCUTANEOUS PLACEMENT  8/10/2021    US GUIDED PERCUTANEOUS PLACEMENT [facility] CLINICAL LEGAC[facility]     Family History   Problem Relation Name Age of Onset    Diabetes Mother      Breast cancer Mother  80    Hypertension Father      Lung cancer Father      Thyroid disease Maternal Grandmother      Diabetes Paternal Grandmother      Breast cancer Paternal Grandmother  60     Social History     Tobacco Use    Smoking status:  Former     Types: Cigarettes    Tobacco comments:     Quit 2013, over 1 ppd for 40 years   Substance Use Topics    Alcohol use: Never     Tobacco Use: Medium Risk (5/14/2024)    Patient History     Smoking Tobacco Use: Former     Smokeless Tobacco Use: Unknown     Passive Exposure: Not on file        ROS  All pertinent positive symptoms are included in the history of present illness.  All other systems have been reviewed and are negative and noncontributory to this patient's current ailments.    VITAL SIGNS  Vitals:    05/14/24 1531   BP: 124/80   Pulse: 65   Temp: 36.5 °C (97.7 °F)   SpO2: 97%     Vitals:    05/14/24 1531   Weight: 72.1 kg (159 lb)      Body mass index is 29.08 kg/m².     PHYSICAL EXAM    GENERAL  Well-appearing, pleasant and cooperative.  No acute distress.    HEENT  HEAD:   Normocephalic.  Atraumatic.  EYES:  PERRLA.  No scleral icterus or conjunctival injection.  EARS:  Tympanic membranes visualized bilaterally without erythema, fluid, or bulging.  NECK:  No adenopathy.  No palpable thyroid enlargement or nodules.    THROAT:  Moist oropharynx without tonsillar enlargement or exudates.    LUNGS:    Clear to auscultation bilaterally.  No wheezes, rales, rhonchi.    CARDIAC:  Regular rate and rhythm.  Normal S1S2.  No murmurs/rubs/gallops.    ABDOMEN:  Soft, non-tender, non-distended.  No hepatosplenomegaly.  Normoactive bowel sounds.    MUSCULOSKELETAL:  No gross abnormalities.   No joint swelling or erythema,.  No spinal or paraspinal tenderness to palpation.    EXTREMITIES:  No LE edema or cyanosis.      NEURO           Alert and oriented x3. No focal deficits.    PSYCH:          Affect appropriate.     Preventative Services reviewed with patient, instructions provided    Assessment/Plan   Problem List Items Addressed This Visit       Insomnia    Relevant Orders    Referral to Adult Sleep Medicine    Obstructive sleep apnea syndrome    Relevant Orders    Referral to Adult Sleep Medicine    Type  2 diabetes mellitus with diabetic neuropathy, unspecified (Multi)    Relevant Orders    CBC    Hemoglobin A1c    Vitamin B12    Albumin , Urine Random    Vitamin D deficiency    Relevant Orders    Vitamin D 25-Hydroxy,Total (for eval of Vitamin D levels)     Other Visit Diagnoses       Medicare annual wellness visit, subsequent    -  Primary    Annual physical exam        Relevant Orders    Lipid Panel    TSH with reflex to Free T4 if abnormal    Comprehensive Metabolic Panel    CBC    Screening for cardiovascular condition        Relevant Orders    Lipid Panel    Encounter for screening mammogram for malignant neoplasm of breast        Asymptomatic menopausal state        Relevant Orders    XR DEXA bone density    ASCUS with positive high risk HPV cervical        Frequent nocturnal awakening        Relevant Orders    Referral to Adult Sleep Medicine    Encounter for screening for malignant neoplasm of colon        Relevant Orders    Referral to Gastroenterology    Personal history of nicotine dependence        Relevant Orders    CT lung screening low dose    Encounter for immunization        Relevant Orders    Pneumococcal conjugate vaccine, 20-valent (PREVNAR 20) (Completed)    Tdap vaccine, age 7 years and older  (BOOSTRIX) (Completed)                 Natividad Nava MD

## 2024-05-16 ENCOUNTER — HOSPITAL ENCOUNTER (OUTPATIENT)
Dept: RADIOLOGY | Facility: HOSPITAL | Age: 69
Discharge: HOME | End: 2024-05-16
Payer: MEDICARE

## 2024-05-16 VITALS — WEIGHT: 159 LBS | BODY MASS INDEX: 31.22 KG/M2 | HEIGHT: 60 IN

## 2024-05-16 DIAGNOSIS — Z12.31 ENCOUNTER FOR SCREENING MAMMOGRAM FOR MALIGNANT NEOPLASM OF BREAST: ICD-10-CM

## 2024-05-16 PROCEDURE — 77063 BREAST TOMOSYNTHESIS BI: CPT | Performed by: RADIOLOGY

## 2024-05-16 PROCEDURE — 77067 SCR MAMMO BI INCL CAD: CPT | Performed by: RADIOLOGY

## 2024-05-16 PROCEDURE — 77067 SCR MAMMO BI INCL CAD: CPT

## 2024-05-18 ENCOUNTER — LAB (OUTPATIENT)
Dept: LAB | Facility: LAB | Age: 69
End: 2024-05-18
Payer: MEDICARE

## 2024-05-18 DIAGNOSIS — E11.40 TYPE 2 DIABETES MELLITUS WITH DIABETIC NEUROPATHY, WITHOUT LONG-TERM CURRENT USE OF INSULIN (MULTI): ICD-10-CM

## 2024-05-18 DIAGNOSIS — Z13.6 SCREENING FOR CARDIOVASCULAR CONDITION: ICD-10-CM

## 2024-05-18 DIAGNOSIS — E55.9 VITAMIN D DEFICIENCY: ICD-10-CM

## 2024-05-18 DIAGNOSIS — Z00.00 ANNUAL PHYSICAL EXAM: ICD-10-CM

## 2024-05-18 LAB
25(OH)D3 SERPL-MCNC: 44 NG/ML (ref 30–100)
ALBUMIN SERPL BCP-MCNC: 4.2 G/DL (ref 3.4–5)
ALP SERPL-CCNC: 111 U/L (ref 33–136)
ALT SERPL W P-5'-P-CCNC: 18 U/L (ref 7–45)
ANION GAP SERPL CALC-SCNC: 13 MMOL/L (ref 10–20)
AST SERPL W P-5'-P-CCNC: 18 U/L (ref 9–39)
BILIRUB SERPL-MCNC: 0.7 MG/DL (ref 0–1.2)
BUN SERPL-MCNC: 18 MG/DL (ref 6–23)
CALCIUM SERPL-MCNC: 9.8 MG/DL (ref 8.6–10.6)
CHLORIDE SERPL-SCNC: 106 MMOL/L (ref 98–107)
CHOLEST SERPL-MCNC: 125 MG/DL (ref 0–199)
CHOLESTEROL/HDL RATIO: 2.9
CO2 SERPL-SCNC: 26 MMOL/L (ref 21–32)
CREAT SERPL-MCNC: 1.52 MG/DL (ref 0.5–1.05)
CREAT UR-MCNC: 159.6 MG/DL (ref 20–320)
EGFRCR SERPLBLD CKD-EPI 2021: 37 ML/MIN/1.73M*2
ERYTHROCYTE [DISTWIDTH] IN BLOOD BY AUTOMATED COUNT: 13.5 % (ref 11.5–14.5)
EST. AVERAGE GLUCOSE BLD GHB EST-MCNC: 128 MG/DL
GLUCOSE SERPL-MCNC: 120 MG/DL (ref 74–99)
HBA1C MFR BLD: 6.1 %
HCT VFR BLD AUTO: 37 % (ref 36–46)
HDLC SERPL-MCNC: 43.6 MG/DL
HGB BLD-MCNC: 11.1 G/DL (ref 12–16)
LDLC SERPL CALC-MCNC: 44 MG/DL
MCH RBC QN AUTO: 28.5 PG (ref 26–34)
MCHC RBC AUTO-ENTMCNC: 30 G/DL (ref 32–36)
MCV RBC AUTO: 95 FL (ref 80–100)
MICROALBUMIN UR-MCNC: 27.7 MG/L
MICROALBUMIN/CREAT UR: 17.4 UG/MG CREAT
NON HDL CHOLESTEROL: 81 MG/DL (ref 0–149)
NRBC BLD-RTO: 0 /100 WBCS (ref 0–0)
PLATELET # BLD AUTO: 66 X10*3/UL (ref 150–450)
POTASSIUM SERPL-SCNC: 4.9 MMOL/L (ref 3.5–5.3)
PROT SERPL-MCNC: 6.7 G/DL (ref 6.4–8.2)
RBC # BLD AUTO: 3.9 X10*6/UL (ref 4–5.2)
SODIUM SERPL-SCNC: 140 MMOL/L (ref 136–145)
T4 FREE SERPL-MCNC: 1.17 NG/DL (ref 0.78–1.48)
TRIGL SERPL-MCNC: 187 MG/DL (ref 0–149)
TSH SERPL-ACNC: 13.41 MIU/L (ref 0.44–3.98)
VIT B12 SERPL-MCNC: 559 PG/ML (ref 211–911)
VLDL: 37 MG/DL (ref 0–40)
WBC # BLD AUTO: 4 X10*3/UL (ref 4.4–11.3)

## 2024-05-18 PROCEDURE — 82306 VITAMIN D 25 HYDROXY: CPT

## 2024-05-18 PROCEDURE — 82043 UR ALBUMIN QUANTITATIVE: CPT

## 2024-05-18 PROCEDURE — 82570 ASSAY OF URINE CREATININE: CPT

## 2024-05-18 PROCEDURE — 85027 COMPLETE CBC AUTOMATED: CPT

## 2024-05-18 PROCEDURE — 84443 ASSAY THYROID STIM HORMONE: CPT

## 2024-05-18 PROCEDURE — 36415 COLL VENOUS BLD VENIPUNCTURE: CPT

## 2024-05-18 PROCEDURE — 83036 HEMOGLOBIN GLYCOSYLATED A1C: CPT

## 2024-05-18 PROCEDURE — 80053 COMPREHEN METABOLIC PANEL: CPT

## 2024-05-18 PROCEDURE — 80061 LIPID PANEL: CPT

## 2024-05-18 PROCEDURE — 84439 ASSAY OF FREE THYROXINE: CPT

## 2024-05-18 PROCEDURE — 82607 VITAMIN B-12: CPT

## 2024-05-29 ENCOUNTER — HOSPITAL ENCOUNTER (OUTPATIENT)
Dept: RADIOLOGY | Facility: CLINIC | Age: 69
Discharge: HOME | End: 2024-05-29
Payer: MEDICARE

## 2024-05-29 DIAGNOSIS — Z87.891 PERSONAL HISTORY OF NICOTINE DEPENDENCE: ICD-10-CM

## 2024-05-29 PROCEDURE — 71271 CT THORAX LUNG CANCER SCR C-: CPT

## 2024-05-31 ENCOUNTER — HOSPITAL ENCOUNTER (OUTPATIENT)
Dept: RADIOLOGY | Facility: CLINIC | Age: 69
Discharge: HOME | End: 2024-05-31
Payer: MEDICARE

## 2024-05-31 DIAGNOSIS — Z78.0 ASYMPTOMATIC MENOPAUSAL STATE: ICD-10-CM

## 2024-05-31 PROCEDURE — 77080 DXA BONE DENSITY AXIAL: CPT | Performed by: RADIOLOGY

## 2024-05-31 PROCEDURE — 77080 DXA BONE DENSITY AXIAL: CPT

## 2024-06-06 ENCOUNTER — LAB (OUTPATIENT)
Dept: LAB | Facility: LAB | Age: 69
End: 2024-06-06
Payer: MEDICARE

## 2024-06-06 DIAGNOSIS — Z86.010 PERSONAL HISTORY OF COLONIC POLYPS: Primary | ICD-10-CM

## 2024-06-06 DIAGNOSIS — I85.00 ESOPHAGEAL VARICES WITHOUT BLEEDING (MULTI): ICD-10-CM

## 2024-06-06 LAB
APTT PPP: 30 SECONDS (ref 27–38)
INR PPP: 1 (ref 0.9–1.1)
PROTHROMBIN TIME: 10.9 SECONDS (ref 9.8–12.8)

## 2024-06-06 PROCEDURE — 85610 PROTHROMBIN TIME: CPT

## 2024-06-06 PROCEDURE — 36415 COLL VENOUS BLD VENIPUNCTURE: CPT

## 2024-06-06 PROCEDURE — 85730 THROMBOPLASTIN TIME PARTIAL: CPT

## 2024-07-03 ENCOUNTER — OFFICE VISIT (OUTPATIENT)
Dept: SLEEP MEDICINE | Facility: CLINIC | Age: 69
End: 2024-07-03
Payer: MEDICARE

## 2024-07-03 VITALS
SYSTOLIC BLOOD PRESSURE: 110 MMHG | HEART RATE: 74 BPM | RESPIRATION RATE: 18 BRPM | BODY MASS INDEX: 30.82 KG/M2 | HEIGHT: 60 IN | WEIGHT: 157 LBS | OXYGEN SATURATION: 94 % | DIASTOLIC BLOOD PRESSURE: 72 MMHG

## 2024-07-03 DIAGNOSIS — I10 BENIGN ESSENTIAL HYPERTENSION: ICD-10-CM

## 2024-07-03 DIAGNOSIS — G47.00 INSOMNIA, UNSPECIFIED TYPE: ICD-10-CM

## 2024-07-03 DIAGNOSIS — G47.00 FREQUENT NOCTURNAL AWAKENING: ICD-10-CM

## 2024-07-03 DIAGNOSIS — G47.33 OBSTRUCTIVE SLEEP APNEA SYNDROME: ICD-10-CM

## 2024-07-03 DIAGNOSIS — E66.9 OBESITY (BMI 30-39.9): ICD-10-CM

## 2024-07-03 DIAGNOSIS — G47.30 SLEEP-DISORDERED BREATHING: Primary | ICD-10-CM

## 2024-07-03 PROCEDURE — 3008F BODY MASS INDEX DOCD: CPT | Performed by: INTERNAL MEDICINE

## 2024-07-03 PROCEDURE — 99214 OFFICE O/P EST MOD 30 MIN: CPT | Performed by: INTERNAL MEDICINE

## 2024-07-03 PROCEDURE — 1125F AMNT PAIN NOTED PAIN PRSNT: CPT | Performed by: INTERNAL MEDICINE

## 2024-07-03 PROCEDURE — 4010F ACE/ARB THERAPY RXD/TAKEN: CPT | Performed by: INTERNAL MEDICINE

## 2024-07-03 PROCEDURE — G2211 COMPLEX E/M VISIT ADD ON: HCPCS | Performed by: INTERNAL MEDICINE

## 2024-07-03 PROCEDURE — 1159F MED LIST DOCD IN RCRD: CPT | Performed by: INTERNAL MEDICINE

## 2024-07-03 PROCEDURE — 3078F DIAST BP <80 MM HG: CPT | Performed by: INTERNAL MEDICINE

## 2024-07-03 PROCEDURE — 3074F SYST BP LT 130 MM HG: CPT | Performed by: INTERNAL MEDICINE

## 2024-07-03 PROCEDURE — 3061F NEG MICROALBUMINURIA REV: CPT | Performed by: INTERNAL MEDICINE

## 2024-07-03 PROCEDURE — 99204 OFFICE O/P NEW MOD 45 MIN: CPT | Performed by: INTERNAL MEDICINE

## 2024-07-03 PROCEDURE — 1160F RVW MEDS BY RX/DR IN RCRD: CPT | Performed by: INTERNAL MEDICINE

## 2024-07-03 PROCEDURE — 3048F LDL-C <100 MG/DL: CPT | Performed by: INTERNAL MEDICINE

## 2024-07-03 PROCEDURE — 3044F HG A1C LEVEL LT 7.0%: CPT | Performed by: INTERNAL MEDICINE

## 2024-07-03 ASSESSMENT — SLEEP AND FATIGUE QUESTIONNAIRES
HOW LIKELY ARE YOU TO NOD OFF OR FALL ASLEEP WHILE LYING DOWN TO REST IN THE AFTERNOON WHEN CIRCUMSTANCES PERMIT: SLIGHT CHANCE OF DOZING
HOW LIKELY ARE YOU TO NOD OFF OR FALL ASLEEP WHILE SITTING QUIETLY AFTER LUNCH WITHOUT ALCOHOL: MODERATE CHANCE OF DOZING
HOW LIKELY ARE YOU TO NOD OFF OR FALL ASLEEP IN A CAR, WHILE STOPPED FOR A FEW MINUTES IN TRAFFIC: WOULD NEVER DOZE
WORRIED_DISTRESSED_DUE_TO_SLEEP: SOMEWHAT
HOW LIKELY ARE YOU TO NOD OFF OR FALL ASLEEP WHILE WATCHING TV: SLIGHT CHANCE OF DOZING
SITING INACTIVE IN A PUBLIC PLACE LIKE A CLASS ROOM OR A MOVIE THEATER: WOULD NEVER DOZE
SATISFACTION_WITH_CURRENT_SLEEP_PATTERN: VERY DISSATISFIED
HOW LIKELY ARE YOU TO NOD OFF OR FALL ASLEEP WHEN YOU ARE A PASSENGER IN A CAR FOR AN HOUR WITHOUT A BREAK: MODERATE CHANCE OF DOZING
SLEEP_PROBLEM_NOTICEABLE_TO_OTHERS: A LITTLE
HOW LIKELY ARE YOU TO NOD OFF OR FALL ASLEEP WHILE SITTING AND READING: SLIGHT CHANCE OF DOZING
HOW LIKELY ARE YOU TO NOD OFF OR FALL ASLEEP WHILE SITTING AND TALKING TO SOMEONE: WOULD NEVER DOZE
ESS-CHAD TOTAL SCORE: 7
DIFFICULTY_STAYING_ASLEEP: VERY SEVERE
WAKING_TOO_EARLY: VERY SEVERE
DIFFICULTY_FALLING_ASLEEP: SEVERE
SLEEP_PROBLEM_INTERFERES_DAILY_ACTIVITIES: SOMEWHAT

## 2024-07-03 ASSESSMENT — PAIN SCALES - GENERAL: PAINLEVEL: 3

## 2024-07-03 ASSESSMENT — PATIENT HEALTH QUESTIONNAIRE - PHQ9
2. FEELING DOWN, DEPRESSED OR HOPELESS: NOT AT ALL
1. LITTLE INTEREST OR PLEASURE IN DOING THINGS: NOT AT ALL
SUM OF ALL RESPONSES TO PHQ9 QUESTIONS 1 AND 2: 0

## 2024-07-03 NOTE — PROGRESS NOTES
[facility] SLEEP [facility]ICINE [facility]IN[facility]  NEW CONSULT VISIT NOTE    PCP: Natividad Nava MD  Referred by: Natividad Nava MD    HISTORY OF PRESENT ILLNESS     Patient ID: Kisha Vincent is a 68 y.o. female who presents to [facility] for a comprehensive sleep medicine evaluation with concerns of suspected sleep apnea.    Patient is here alone today.  To review, patient's medical history is notable for obesity (BMI 30), HTN, T2DM, emphysema, migraine, and suspected sleep apnea.      Patient never had sleep study done yet.     SLEEP-WAKE SCHEDULE  Bedtime:  9:30 PM daily  Subjective sleep latency: 1 hour   Difficulty falling asleep: yes due to mind-racing / rumination / worry  Number of awakenings:  3 times per night spontaneously for unknown reasons  Nocturia: No  Falls back asleep in 1 hour, sometimes cannot fall back asleep  Final wake time:  3-4 AM daily  Out of bed time:  3-4 AM daily  Shift work: No  Naps: 1-2 times per week for 1.5 hours  Feels rested after a nap: No  Average sleep duration (excluding naps): 3-4 hours     SLEEP ENVIRONMENT  Sleep location: bed  Sleep status: sleeps with   Preferred sleep position: left side  TV in bedroom: No  Room is dark: Yes  Room is quiet: Yes  Room is cool: Yes    SLEEP HABITS  Smoking: no  ETOH: no  Marijuana: no  Caffeine: 2 cups coffee daily in the morning  Sleep aids: yes on tylenol-PM, melatonin    SLEEP ROS  Night symptoms: POSITIVE for snoring and nasal congestion  and NEGATIVE for witnessed apnea, wake up gasping and/or choking for air, mouth breathing, night sweats during sleep, waking up with racing heart, heartburn or sour taste in mouth at night, nocturnal cough, and nocturia  Morning symptoms: POSITIVE for unrefreshing sleep and morning headache and NEGATIVE for morning dry mouth and morning sore throat  Daytime symptoms POSITIVE for excessive daytime sleepiness, fatigue, and trouble staying focused in daytime  and NEGATIVE for trouble remembering things in daytime and irritability in daytime. Patient does not drive  Hypersomnia / narcolepsy symptoms: Patient denies symptoms of a hypersomnolence disorder such as sleep paralysis, sleep-related hallucinations, recurrent sleep attacks, automatic behaviors, and cataplexy.   Parasomnia symptoms: Patient denies symptoms of parasomnia such as sleepwalking, sleeptalking, sleep-eating, acting out dreams, and nightmares.   Movements in sleep: Patient denies problematic movements in sleep such as seizures during sleep, frequent leg kicks / jerks while asleep, and sleep-related bruxism. Patient reports sleep-related leg cramps    RLS screen: Patient admits having urge to move legs that occurs at rest (sitting, getting into bed, or lying n bed), worse in the evening, and relieved temporarily with movement.   Frequency of RLS symptoms: 1-2 times per week  RLS symptoms occurring in daytime: No   RLS symptoms progressing to arms: No   RLS symptoms affect sleep onset: Yes   RLS symptoms wakes patient up from sleep: Yes   Current or past treatment for RLS: none  Precluding events of RLS symptoms: none    WEIGHT: fluctuating    Claustrophobia: No    REVIEW OF SYSTEMS     All other systems have been reviewed and are negative.    ALLERGIES     Allergies   Allergen Reactions    Latex, Natural Rubber Other and Unknown     sensitivity    Amoxicillin Rash    Ampicillin Hives and Rash    Cefazolin Hives and Rash       MEDICATIONS     Current Outpatient Medications   Medication Sig Dispense Refill    acetaminophen (Tylenol) 500 mg tablet Take 1 tablet (500 mg) by mouth every 6 hours if needed for mild pain (1 - 3) or fever (temp greater than 38.0 C).      albuterol 90 mcg/actuation inhaler Inhale 1 puff every 4 hours if needed for wheezing. 18 g 1    atorvastatin (Lipitor) 40 mg tablet Take 1 tablet (40 mg) by mouth once daily. 90 tablet 1    cholecalciferol (Vitamin D-3) 50 mcg (2,000 unit) capsule  Take 1 capsule (50 mcg) by mouth early in the morning..      FreeStyle Lite Strips strip FREESTYLE LT STRIPS HEATHER      losartan (Cozaar) 100 mg tablet TAKE 1 TABLET(100 MG) BY MOUTH EVERY DAY 90 tablet 0    metFORMIN  mg 24 hr tablet Take 1 tablet (500 mg) by mouth 2 times a day with meals. 180 tablet 1    nadolol (Corgard) 40 mg tablet Take 1 tablet (40 mg) by mouth once daily. 90 tablet 1    omega-3 fatty acids-fish oil (One-Per-Day Omega-3) 684-1,200 mg capsule Take by mouth.  Omega 3 1200 MG Oral Capsule      Synthroid 200 mcg tablet Take 1 tablet (200 mcg) by mouth early in the morning.. Take on an empty stomach at the same time each day, either 30 to 60 minutes prior to breakfast 30 tablet 5    zinc gluconate 50 mg tablet Take 1 tablet (50 mg of elemental zinc) by mouth once daily.      cyanocobalamin (Vitamin B-12) 1,000 mcg tablet Take 1 tablet (1,000 mcg) by mouth once daily.      gabapentin (Neurontin) 400 mg capsule Take 1 capsule (400 mg) by mouth 3 times a day.      linaGLIPtin (Tradjenta) 5 mg tablet Take 1 tablet (5 mg) by mouth once daily.      NON FORMULARY CHLORZAOXADONE MUSCLE RELAXER      zinc sulfate (Zincate) 220 (50 Zn) MG capsule Take 1 capsule (50 mg of elemental zinc) by mouth.  Zinc 220 (50 Zn) MG Oral Capsule       No current facility-administered medications for this visit.       PAST HISTORIES     PERTINENT PAST MEDICAL HISTORY: See HPI    PERTINENT PAST SURGICAL HISTORY for Sleep Medicine:  tonsillectomy and adenoidectomy    PERTINENT FAMILY HISTORY for Sleep Medicine:  Patient denies family history of sleep apnea.    PERTINENT SOCIAL HISTORY:  She  reports that she has quit smoking. Her smoking use included cigarettes. She does not have any smokeless tobacco history on file. She reports that she does not drink alcohol. No history on file for drug use. She currently lives with spouse and retired from work. Previous occupation was teacher    Active Problems, Allergy List,  Medication List, and PMH/PSH/FH/Social Hx have been reviewed and reconciled in chart. No significant changes unless documented in the pertinent chart section. Updates made when necessary.     PHYSICAL EXAM     VITAL SIGNS: /72   Pulse 74   Resp 18   Ht 1.524 m (5')   Wt 71.2 kg (157 lb)   SpO2 94%   BMI 30.66 kg/m²     NECK CIRCUMFERENCE: 15.5 inches    ESS: 7  FREDDIE: 20  STOPBAN    Physical Exam  Constitutional: Awake, not in distress  Head: normocephalic, atraumatic  Craniofacial: no retrognathia  Sinus: no tenderness to palpation  Nose: + bilateral inferior turbinate hypertrophy, no nasal congestion  Dental: Class 2 bite, + overjet, no crossbite  Palate: Normal  Oropharynx: Duran tongue position 3, Mallampati 3, lateral wall narrowing grade 3   Tonsils: not visualized  Uvula: midline on phonation, + elongated, + swollen  Tongue: Midline on protrusion, + Tongue scalloping, no macroglossia  Lungs: Clear to auscultation bilateral, no rales  Heart: Regular rate and rhythm, no murmurs  Skin: Warm, no rash  Neuro: No tremors, moves all extremities  Psych: alert and oriented to time, place, and person    RESULTS/DATA     Iron (UG/DL)   Date Value   2018 83   2018 19 (L)     Iron Saturation (%)   Date Value   2018 29.0   2018 12.5     TIBC (UG/DL)   Date Value   2018 286   2018 152 (L)     Ferritin   Date Value   2018 420 NG/ML (H)   02/15/2018 135 ug/L       Bicarbonate   Date Value Ref Range Status   2024 26 21 - 32 mmol/L Final       ASSESSMENT/PLAN     Kisha Vincent is a 68 y.o. female who is seen today in [facility] for the following problems:.     SUSPECTED SLEEP APNEA  - Due to history of COPD/emphysema, recommend split night PSG (split if AHI>15) with CO2 monitoring to evaluate for JAYNE.  - Discussed sleep apnea in detail to include pathophysiology, risk factors, diagnostic options, treatment options, and  cardiovascular / neurologic consequences of untreated JAYNE.   - Supportive management as follows: Lose weight. Stay off your back when sleeping. Avoid smoking, alcohol, and sedating medications if applicable. Don't drive when sleepy.    RESTLESS LEG SYNDROME  - Check ferritin and TSAT. Replace if ferritin<75.  - If above tests are normal, start Ropinirole.   - Supportive management: Avoid triggers of RLS such as caffeine, alcohol, nicotine, medications, and low iron. Taper off or reduce dose of medications that can cause RLS such as but not limited ot antidepressants except Buproprion and Trazodone, 1st generation antihistamines, antipsychotics, anti-emetics except ondansetron, melatonin, topiramate, etc. Consider switch antidepressants to Bupropion if feasible. May take warm bath 2 hours before bedtime. Massage and/or stretch legs while in bed    OBESITY   - Recommend weight loss with diet and exercise.  - Weight loss can help in long term management of JAYNE.  - Defer management to PCP.    HYPERTENSION  - BP stable today.  - Continue anti-hypertensive medications per PCP.  - Supportive management: low salt DASH diet (less than 2000 mg sodium intake daily), moderate intensity aerobic exercise at least 30 minutes 5 days per week, reduce stress, quit smoking, limit alcohol, lose weight, and monitor BP once daily  - PCP following. Defer management to PCP.      Follow-up in 2-3 weeks after sleep study.    All of patient's questions were answered. She verbalizes understanding and agreement with my assessment and plan. Refer to after-visit-summary (AVS) for patient education and more details on sleep study preparation, troubleshooting issues with PAP usage, proper cleaning instructions of PAP supplies, and usual recommended replacement schedule for each of the PAP supplies.

## 2024-07-10 DIAGNOSIS — E11.40 TYPE 2 DIABETES MELLITUS WITH DIABETIC NEUROPATHY, WITHOUT LONG-TERM CURRENT USE OF INSULIN (MULTI): ICD-10-CM

## 2024-07-10 RX ORDER — BLOOD-GLUCOSE METER
KIT MISCELLANEOUS
Qty: 100 STRIP | Refills: 3 | Status: SHIPPED | OUTPATIENT
Start: 2024-07-10

## 2024-07-18 NOTE — PATIENT INSTRUCTIONS
"Thank you for coming to the Sleep Medicine Clinic today! Your sleep medicine provider today was: Lona Day MD Below is a summary of your treatment plan, patient education, other important information, and our contact numbers.      TREATMENT PLAN     - Do the following testing: Split night sleep study  - Please read the \"Patient Education\" section below for more detailed information. Try implementing tips, reminders, strategies, and supportive management.   - If not yet done, please sign up for SaveMeeting to make a future schedule, send prescription requests, or send messages.    Follow-up Appointment:   Follow-up in 2-3 weeks after sleep study.    PATIENT EDUCATION     OBSTRUCTIVE SLEEP APNEA (JAYNE) is a sleep disorder where your upper airway muscles relax during sleep and the airway intermittently and repetitively narrows and collapses leading to partially blocked airway (hypopnea) or completely blocked airway (apnea) which, in turn, can disrupt breathing in sleep, lower oxygen levels while you sleep and cause night time wakings. Because both apnea and hypopnea may cause higher carbon dioxide or low oxygen levels, untreated JAYNE can lead to heart arrhythmia, elevation of blood pressure, and make it harder for the body to consolidate memory and facilitate metabolism (leading to higher blood sugars at night). Frequent partial arousals occur during sleep resulting in sleep deprivation and daytime sleepiness. JAYNE is associated with an increased risk of cardiovascular disease, stroke, hypertension, and insulin resistance. Moreover, untreated JAYNE with excessive daytime sleepiness can increase the risk of motor vehicular accidents.    Below are conservative strategies for JAYNE regardless of JAYNE severity are:   Positional therapy - Avoid sleeping on your back.   Healthy diet and regular exercise to optimize weight is highly encouraged.   Avoid alcohol late in the evening and sedative-hypnotics as these substances can make " sleep apnea worse.   Improve breathing through the nose with intranasal steroid spray, saline rinse, or antihistamines    Safety: Avoid driving vehicle and operating heavy equipment while sleepy. Drowsy driving may lead to life-threatening motor vehicle accidents. A person driving while sleepy is 5 times more likely to have an accident. If you feel sleepy, pull over and take a short power nap (sleep for less than 30 minutes). Otherwise, ask somebody to drive you.    Treatment options for sleep apnea include weight management, positional therapy, Positive Airway Therapy (PAP) therapy, oral appliance therapy, hypoglossal nerve stimulator (Inspire) and select airway surgeries.    Sleep Testing for sleep apnea: The best and ideal way to check out if you have sleep apnea is to do an overnight sleep study in the sleep laboratory. Alternatively, a home sleep apnea test can also be done depending on your insurance and risk factors.     If you are having a home sleep apnea test, kindly allot 1 hour during pickup of the testing kit as you will have to complete paperwork and listen to the sleep technician for in-person on-the-spot demonstration and instructions on how to hook up the testing kit at home. Do the test for 1 day and start off with sleeping on your back. If you sleep on your side in the middle of night or you have always been a side or stomach-sleeper, it is ok as long as you have some time on your back and off-back.     If you are having an overnight in-lab sleep study, please make sure to bring toiletries, a comfy pillow, additional warm blankets, and any nighttime medications (include as-needed inhaler, pain pill, etc) that you may regularly take. Also, be sure to eat dinner before you arrive as we generally do not provide meals inside the sleep testing center. Lastly, in order to fall asleep faster in the sleep testing center, we advise patients to wake up 2 hours earlier on the morning of scheduled testing and  avoid napping 2 days prior testing. Sometimes, your sleep provider may prescribe a sleep aid to be taken at lights out in the sleep testing center. If you are taking a sleep aid, consider having somebody pick you up after the sleep testing.    Overnight sleep studies may be scheduled on a weekday or weekend. We also perform daytime testing for shift workers on a case-by-case basis.    Once you have booked an appointment to do the sleep study, please contact my office for follow-up visit to discuss results.    On the other hand, if you have any of the following, please consider calling the sleep testing center to RESCHEDULE your sleep study appointment:  If you tested positive for COVID within 10 days of your sleep study appointment.  If you were exposed to somebody who was confirmed for COVID within 10 days of your sleep study appointment and now you are having symptoms of possible COVID  If you have fever>100F or any acute symptoms that you think will lead to poor sleep during testing (e.g. new or worsening stuffy nose not relieved by steroid nasal spray)  If you have traveled domestically or internationally in the last month and now you are having symptoms of possible COVID    You can also go to the following EDUCATION WEBSITES for further information:   American Academy of Sleep Medicine http://sleepeducation.org  National Sleep Foundation: https://sleepfoundation.org  American Sleep Apnea Association: https://www.sleepapnea.org (for patients with sleep apnea)  Narcolepsy Network: https://www.narcolepsynetwork.org (for patients with narcolepsy)  [employer]: https://www.wakeupnarcolepsy.org (for patients with narcolepsy)  Hypersomnia Foundation: https://www.hypersomniafoundation.org (for patients with idiopathic hypersomnia)  RLS foundation: https://www.rls.org (for patients with restless leg syndrome)    IMPORTANT INFORMATION     Call 911 for medical emergencies.  Our offices are generally open from  Monday-Friday, 8 am - 5 pm.   There are no supporting services by either the sleep doctors or their staff on weekends and Holidays, or after 5 PM on weekdays.   If you need to get in touch with me, you may either call my office number or you can use [employer].  If a referral for a test, for CPAP, or for another specialist was made, and you have not heard about scheduling this within a week, please call scheduling at [phone]).  If you are unable to make your appointment for clinic or an overnight study, kindly call the office or sleep testing center at least 48 hours in advance to cancel and reschedule.  If you are on CPAP, please bring your device's card and/or the device to each clinic appointment.   In case of problems with PAP machine or mask interface, please contact your [employer] ([employer]urable Medical [employer]) company first. [employer] is the company who provides you the machine and/or PAP supplies.       PRESCRIPTIONS     We require 7 days advanced notice for prescription refills. If we do not receive the request in this time, we cannot guarantee that your medication will be refilled in time.    IMPORTANT PHONE NUMBERS     Sleep Medicine Clinic Fax: [phone]  Appointments (for Pediatric Sleep Clinic): [phone] - option 1  Appointments (for Adult Sleep Clinic): [phone] - option 2  Appointments (For Sleep Studies): [phone] - option 3  Behavioral Sleep Medicine: [phone]  Sleep Surgery: [phone]  Nutrition Service: [phone]  Weight management clinics with endocrinology: [phone]  Bariatric Services: [phone] (includes weight loss medications and weight loss surgery)  [facility]: [phone] (offers holistic approaches to weight management)  ENT (Otolaryngology): [phone]  Headache Clinic (Neurology): [phone]  Neurology: [phone]  Psychiatry: [phone]  Pulmonary Function Testing (PFT) Center: [phone]  Pulmonary Medicine:  [phone]  Medical Service [facility] (DME): [phone]      OUR SLEEP TESTING LOCATIONS     Our team will contact you to schedule your sleep study, however, you can contact us as follow:  Main Phone Line (scheduling only): [phone]), option 3  Adult and Pediatric Locations  [facility] (6 years and older): [facility] - 4th floor ([address]) After hours line: [phone]  [facility] (Main campus: All ages): [facility], 6th floor. After hours line: [phone]   [place] (5 years and older; younger considered on case-by-case basis): [address]; [address], [address]. Scheduling  After hours line: [phone]   [place] (6 years and older): [address]; [address]; [address]   [place] (6 years and older): [address] [facility] [address], [address]  After hours line: [phone]   [place] (13 years and older) in [place]: [address], 2nd floor  After hours line: [phone]  [facility] (13 year and older): [address]  After hours line: [phone]     Adult Only Locations:   [place] (18 years and older): [address] [facility], 2nd floor   [place] (18 years and older): [address] [facility]; 4th floor  After hours line: [phone]  [facility] (18 years and older) at [place]: [address] [facility]  After hours line: [phone]     CONTACTING YOUR SLEEP MEDICINE PROVIDER AND SLEEP TEAM      For issues with your machine or mask interface, please call your DME provider first. [employer] stands for durable medical company. [employer] is the company who provides you the machine and/or PAP supplies / accessories.   To schedule, cancel, or reschedule SLEEP STUDY APPOINTMENTS, please call the Main Phone Line at [phone] - option 3.   To schedule, cancel, or reschedule CLINIC APPOINTMENTS, you can do it in \"MyChart\", call [phone] (direct line) to speak with my " "practice lead (Emeli Gonzalez), or call the Main Phone Line at [phone] - option 2  For CLINICAL QUESTIONS or MEDICATION REFILLS, please call direct line for Adult Sleep Nurses at [phone].   Lastly, you can also send a message directly to your provider through \"My Chart\", which is the email service through your  Records Account: [email or web address]       Here at [facility], we wish you a restful sleep!    "

## 2024-07-20 ENCOUNTER — LAB (OUTPATIENT)
Dept: LAB | Facility: LAB | Age: 69
End: 2024-07-20
Payer: MEDICARE

## 2024-07-20 DIAGNOSIS — N18.30 CHRONIC KIDNEY DISEASE, STAGE 3 UNSPECIFIED (MULTI): Primary | ICD-10-CM

## 2024-07-20 LAB
ALBUMIN SERPL BCP-MCNC: 4 G/DL (ref 3.4–5)
ANION GAP SERPL CALC-SCNC: 12 MMOL/L (ref 10–20)
APPEARANCE UR: CLEAR
BILIRUB UR STRIP.AUTO-MCNC: NEGATIVE MG/DL
BUN SERPL-MCNC: 19 MG/DL (ref 6–23)
CALCIUM SERPL-MCNC: 9.5 MG/DL (ref 8.6–10.6)
CHLORIDE SERPL-SCNC: 105 MMOL/L (ref 98–107)
CO2 SERPL-SCNC: 28 MMOL/L (ref 21–32)
COLOR UR: ABNORMAL
CREAT SERPL-MCNC: 1.4 MG/DL (ref 0.5–1.05)
CREAT UR-MCNC: 139.8 MG/DL (ref 20–320)
EGFRCR SERPLBLD CKD-EPI 2021: 41 ML/MIN/1.73M*2
ERYTHROCYTE [DISTWIDTH] IN BLOOD BY AUTOMATED COUNT: 13.4 % (ref 11.5–14.5)
GLUCOSE SERPL-MCNC: 247 MG/DL (ref 74–99)
GLUCOSE UR STRIP.AUTO-MCNC: NORMAL MG/DL
HCT VFR BLD AUTO: 35.3 % (ref 36–46)
HGB BLD-MCNC: 10.2 G/DL (ref 12–16)
KETONES UR STRIP.AUTO-MCNC: NEGATIVE MG/DL
LEUKOCYTE ESTERASE UR QL STRIP.AUTO: NEGATIVE
MCH RBC QN AUTO: 27.8 PG (ref 26–34)
MCHC RBC AUTO-ENTMCNC: 28.9 G/DL (ref 32–36)
MCV RBC AUTO: 96 FL (ref 80–100)
MUCOUS THREADS #/AREA URNS AUTO: ABNORMAL /LPF
NITRITE UR QL STRIP.AUTO: NEGATIVE
NRBC BLD-RTO: 0 /100 WBCS (ref 0–0)
PH UR STRIP.AUTO: 5 [PH]
PHOSPHATE SERPL-MCNC: 3.8 MG/DL (ref 2.5–4.9)
PLATELET # BLD AUTO: 59 X10*3/UL (ref 150–450)
POTASSIUM SERPL-SCNC: 5.1 MMOL/L (ref 3.5–5.3)
PROT UR STRIP.AUTO-MCNC: NEGATIVE MG/DL
PROT UR-ACNC: 21 MG/DL (ref 5–24)
PROT/CREAT UR: 0.15 MG/MG CREAT (ref 0–0.17)
PTH-INTACT SERPL-MCNC: 102.6 PG/ML (ref 18.5–88)
RBC # BLD AUTO: 3.67 X10*6/UL (ref 4–5.2)
RBC # UR STRIP.AUTO: ABNORMAL /UL
RBC #/AREA URNS AUTO: ABNORMAL /HPF
SODIUM SERPL-SCNC: 140 MMOL/L (ref 136–145)
SP GR UR STRIP.AUTO: 1.02
SQUAMOUS #/AREA URNS AUTO: ABNORMAL /HPF
UROBILINOGEN UR STRIP.AUTO-MCNC: NORMAL MG/DL
WBC # BLD AUTO: 2.9 X10*3/UL (ref 4.4–11.3)
WBC #/AREA URNS AUTO: ABNORMAL /HPF

## 2024-07-20 PROCEDURE — 81001 URINALYSIS AUTO W/SCOPE: CPT

## 2024-07-20 PROCEDURE — 36415 COLL VENOUS BLD VENIPUNCTURE: CPT

## 2024-07-20 PROCEDURE — 82570 ASSAY OF URINE CREATININE: CPT

## 2024-07-20 PROCEDURE — 85027 COMPLETE CBC AUTOMATED: CPT

## 2024-07-20 PROCEDURE — 80069 RENAL FUNCTION PANEL: CPT

## 2024-07-20 PROCEDURE — 84156 ASSAY OF PROTEIN URINE: CPT

## 2024-07-20 PROCEDURE — 83970 ASSAY OF PARATHORMONE: CPT

## 2024-07-22 ENCOUNTER — TELEPHONE (OUTPATIENT)
Dept: PRIMARY CARE | Facility: CLINIC | Age: 69
End: 2024-07-22
Payer: MEDICARE

## 2024-07-22 NOTE — TELEPHONE ENCOUNTER
Call placed to patient, advised all orders are electronic if she goes to  lab. Pt states she will be going to a  lab and does not have any other questions.

## 2024-07-22 NOTE — TELEPHONE ENCOUNTER
Pt left voicemail stating she tried to print out her lab orders and was not able too. Wants to know if this needs printed or not.

## 2024-07-28 ENCOUNTER — HOSPITAL ENCOUNTER (OUTPATIENT)
Dept: RADIOLOGY | Facility: HOSPITAL | Age: 69
Discharge: HOME | End: 2024-07-28
Payer: MEDICARE

## 2024-07-28 DIAGNOSIS — M18.30 UNILATERAL POST-TRAUMATIC OSTEOARTHRITIS OF FIRST CARPOMETACARPAL JOINT, UNSPECIFIED HAND: ICD-10-CM

## 2024-07-28 DIAGNOSIS — R31.9 HEMATURIA, UNSPECIFIED: ICD-10-CM

## 2024-07-28 PROCEDURE — 76770 US EXAM ABDO BACK WALL COMP: CPT | Performed by: RADIOLOGY

## 2024-07-28 PROCEDURE — 76770 US EXAM ABDO BACK WALL COMP: CPT

## 2024-07-30 DIAGNOSIS — Z76.0 MEDICATION REFILL: ICD-10-CM

## 2024-07-30 RX ORDER — ALBUTEROL SULFATE 90 UG/1
1 AEROSOL, METERED RESPIRATORY (INHALATION) EVERY 4 HOURS PRN
Qty: 18 G | Refills: 1 | Status: SHIPPED | OUTPATIENT
Start: 2024-07-30

## 2024-07-31 ENCOUNTER — LAB (OUTPATIENT)
Dept: LAB | Facility: LAB | Age: 69
End: 2024-07-31
Payer: MEDICARE

## 2024-07-31 ENCOUNTER — OFFICE VISIT (OUTPATIENT)
Dept: HEMATOLOGY/ONCOLOGY | Facility: HOSPITAL | Age: 69
End: 2024-07-31
Payer: MEDICARE

## 2024-07-31 ENCOUNTER — TELEPHONE (OUTPATIENT)
Dept: HEMATOLOGY/ONCOLOGY | Facility: HOSPITAL | Age: 69
End: 2024-07-31

## 2024-07-31 VITALS
SYSTOLIC BLOOD PRESSURE: 130 MMHG | OXYGEN SATURATION: 97 % | HEIGHT: 60 IN | BODY MASS INDEX: 31.88 KG/M2 | WEIGHT: 162.37 LBS | TEMPERATURE: 97.2 F | HEART RATE: 68 BPM | DIASTOLIC BLOOD PRESSURE: 81 MMHG | RESPIRATION RATE: 16 BRPM

## 2024-07-31 DIAGNOSIS — D64.9 ANEMIA, UNSPECIFIED TYPE: ICD-10-CM

## 2024-07-31 DIAGNOSIS — E03.9 HYPOTHYROIDISM, UNSPECIFIED TYPE: ICD-10-CM

## 2024-07-31 DIAGNOSIS — K70.30 ALCOHOLIC CIRRHOSIS OF LIVER WITHOUT ASCITES (MULTI): ICD-10-CM

## 2024-07-31 DIAGNOSIS — D61.818 PANCYTOPENIA (MULTI): Primary | ICD-10-CM

## 2024-07-31 DIAGNOSIS — K74.60 CIRRHOSIS OF LIVER WITHOUT ASCITES, UNSPECIFIED HEPATIC CIRRHOSIS TYPE (MULTI): ICD-10-CM

## 2024-07-31 DIAGNOSIS — D61.818 PANCYTOPENIA (MULTI): ICD-10-CM

## 2024-07-31 LAB
AFP SERPL-MCNC: <4 NG/ML (ref 0–9)
ALBUMIN SERPL BCP-MCNC: 4.2 G/DL (ref 3.4–5)
ALP SERPL-CCNC: 140 U/L (ref 33–136)
ALT SERPL W P-5'-P-CCNC: 24 U/L (ref 7–45)
ANION GAP SERPL CALC-SCNC: 9 MMOL/L (ref 10–20)
AST SERPL W P-5'-P-CCNC: 22 U/L (ref 9–39)
BASOPHILS # BLD AUTO: 0.03 X10*3/UL (ref 0–0.1)
BASOPHILS NFR BLD AUTO: 0.7 %
BILIRUB SERPL-MCNC: 0.6 MG/DL (ref 0–1.2)
BUN SERPL-MCNC: 16 MG/DL (ref 6–23)
CALCIUM SERPL-MCNC: 10 MG/DL (ref 8.6–10.3)
CHLORIDE SERPL-SCNC: 107 MMOL/L (ref 98–107)
CO2 SERPL-SCNC: 28 MMOL/L (ref 21–32)
CREAT SERPL-MCNC: 1.4 MG/DL (ref 0.5–1.05)
EGFRCR SERPLBLD CKD-EPI 2021: 41 ML/MIN/1.73M*2
EOSINOPHIL # BLD AUTO: 0.18 X10*3/UL (ref 0–0.7)
EOSINOPHIL NFR BLD AUTO: 4.3 %
ERYTHROCYTE [DISTWIDTH] IN BLOOD BY AUTOMATED COUNT: 13.8 % (ref 11.5–14.5)
FERRITIN SERPL-MCNC: 52 NG/ML (ref 8–150)
FOLATE SERPL-MCNC: 11.8 NG/ML
GLUCOSE SERPL-MCNC: 162 MG/DL (ref 74–99)
HCT VFR BLD AUTO: 36.6 % (ref 36–46)
HGB BLD-MCNC: 11.2 G/DL (ref 12–16)
HGB RETIC QN: 28 PG (ref 28–38)
IMM GRANULOCYTES # BLD AUTO: 0.01 X10*3/UL (ref 0–0.7)
IMM GRANULOCYTES NFR BLD AUTO: 0.2 % (ref 0–0.9)
IMMATURE RETIC FRACTION: 14.9 %
INR PPP: 1 (ref 0.9–1.1)
IRON SATN MFR SERPL: 17 % (ref 25–45)
IRON SERPL-MCNC: 62 UG/DL (ref 35–150)
LDH SERPL L TO P-CCNC: 130 U/L (ref 84–246)
LYMPHOCYTES # BLD AUTO: 0.79 X10*3/UL (ref 1.2–4.8)
LYMPHOCYTES NFR BLD AUTO: 18.9 %
MCH RBC QN AUTO: 28.1 PG (ref 26–34)
MCHC RBC AUTO-ENTMCNC: 30.6 G/DL (ref 32–36)
MCV RBC AUTO: 92 FL (ref 80–100)
MONOCYTES # BLD AUTO: 0.27 X10*3/UL (ref 0.1–1)
MONOCYTES NFR BLD AUTO: 6.5 %
NEUTROPHILS # BLD AUTO: 2.89 X10*3/UL (ref 1.2–7.7)
NEUTROPHILS NFR BLD AUTO: 69.4 %
NRBC BLD-RTO: 0 /100 WBCS (ref 0–0)
PLATELET # BLD AUTO: 68 X10*3/UL (ref 150–450)
POTASSIUM SERPL-SCNC: 4.6 MMOL/L (ref 3.5–5.3)
PROT SERPL-MCNC: 7 G/DL (ref 6.4–8.2)
PROTHROMBIN TIME: 11.2 SECONDS (ref 9.8–12.8)
RBC # BLD AUTO: 3.98 X10*6/UL (ref 4–5.2)
RETICS #: 0.07 X10*6/UL (ref 0.02–0.11)
RETICS/RBC NFR AUTO: 1.8 % (ref 0.5–2)
SODIUM SERPL-SCNC: 139 MMOL/L (ref 136–145)
TIBC SERPL-MCNC: 367 UG/DL (ref 240–445)
TSH SERPL-ACNC: 3.97 MIU/L (ref 0.44–3.98)
UIBC SERPL-MCNC: 305 UG/DL (ref 110–370)
VIT B12 SERPL-MCNC: 609 PG/ML (ref 211–911)
WBC # BLD AUTO: 4.2 X10*3/UL (ref 4.4–11.3)

## 2024-07-31 PROCEDURE — 3048F LDL-C <100 MG/DL: CPT | Performed by: INTERNAL MEDICINE

## 2024-07-31 PROCEDURE — 82746 ASSAY OF FOLIC ACID SERUM: CPT

## 2024-07-31 PROCEDURE — 99214 OFFICE O/P EST MOD 30 MIN: CPT | Performed by: INTERNAL MEDICINE

## 2024-07-31 PROCEDURE — 85025 COMPLETE CBC W/AUTO DIFF WBC: CPT

## 2024-07-31 PROCEDURE — 82105 ALPHA-FETOPROTEIN SERUM: CPT

## 2024-07-31 PROCEDURE — 3079F DIAST BP 80-89 MM HG: CPT | Performed by: INTERNAL MEDICINE

## 2024-07-31 PROCEDURE — 83540 ASSAY OF IRON: CPT

## 2024-07-31 PROCEDURE — 83550 IRON BINDING TEST: CPT

## 2024-07-31 PROCEDURE — 1159F MED LIST DOCD IN RCRD: CPT | Performed by: INTERNAL MEDICINE

## 2024-07-31 PROCEDURE — 85045 AUTOMATED RETICULOCYTE COUNT: CPT

## 2024-07-31 PROCEDURE — 3008F BODY MASS INDEX DOCD: CPT | Performed by: INTERNAL MEDICINE

## 2024-07-31 PROCEDURE — 3044F HG A1C LEVEL LT 7.0%: CPT | Performed by: INTERNAL MEDICINE

## 2024-07-31 PROCEDURE — 99204 OFFICE O/P NEW MOD 45 MIN: CPT | Performed by: INTERNAL MEDICINE

## 2024-07-31 PROCEDURE — 82607 VITAMIN B-12: CPT

## 2024-07-31 PROCEDURE — 4010F ACE/ARB THERAPY RXD/TAKEN: CPT | Performed by: INTERNAL MEDICINE

## 2024-07-31 PROCEDURE — 82728 ASSAY OF FERRITIN: CPT

## 2024-07-31 PROCEDURE — 36415 COLL VENOUS BLD VENIPUNCTURE: CPT

## 2024-07-31 PROCEDURE — 84443 ASSAY THYROID STIM HORMONE: CPT

## 2024-07-31 PROCEDURE — 3061F NEG MICROALBUMINURIA REV: CPT | Performed by: INTERNAL MEDICINE

## 2024-07-31 PROCEDURE — 80053 COMPREHEN METABOLIC PANEL: CPT

## 2024-07-31 PROCEDURE — 1125F AMNT PAIN NOTED PAIN PRSNT: CPT | Performed by: INTERNAL MEDICINE

## 2024-07-31 PROCEDURE — 85610 PROTHROMBIN TIME: CPT

## 2024-07-31 PROCEDURE — 83615 LACTATE (LD) (LDH) ENZYME: CPT

## 2024-07-31 PROCEDURE — 3075F SYST BP GE 130 - 139MM HG: CPT | Performed by: INTERNAL MEDICINE

## 2024-07-31 ASSESSMENT — ENCOUNTER SYMPTOMS
OCCASIONAL FEELINGS OF UNSTEADINESS: 1
GASTROINTESTINAL NEGATIVE: 1
LOSS OF SENSATION IN FEET: 1
FATIGUE: 1
RESPIRATORY NEGATIVE: 1
FEVER: 0
DEPRESSION: 0
CARDIOVASCULAR NEGATIVE: 1

## 2024-07-31 ASSESSMENT — PAIN SCALES - GENERAL: PAINLEVEL: 6

## 2024-07-31 ASSESSMENT — LIFESTYLE VARIABLES
SKIP TO QUESTIONS 9-10: 1
HOW OFTEN DO YOU HAVE SIX OR MORE DRINKS ON ONE OCCASION: NEVER
HOW OFTEN DO YOU HAVE A DRINK CONTAINING ALCOHOL: NEVER
HOW MANY STANDARD DRINKS CONTAINING ALCOHOL DO YOU HAVE ON A TYPICAL DAY: PATIENT DOES NOT DRINK
AUDIT-C TOTAL SCORE: 0

## 2024-07-31 ASSESSMENT — PATIENT HEALTH QUESTIONNAIRE - PHQ9
SUM OF ALL RESPONSES TO PHQ9 QUESTIONS 1 AND 2: 0
2. FEELING DOWN, DEPRESSED OR HOPELESS: NOT AT ALL
1. LITTLE INTEREST OR PLEASURE IN DOING THINGS: NOT AT ALL

## 2024-07-31 ASSESSMENT — COLUMBIA-SUICIDE SEVERITY RATING SCALE - C-SSRS
1. IN THE PAST MONTH, HAVE YOU WISHED YOU WERE DEAD OR WISHED YOU COULD GO TO SLEEP AND NOT WAKE UP?: NO
6. HAVE YOU EVER DONE ANYTHING, STARTED TO DO ANYTHING, OR PREPARED TO DO ANYTHING TO END YOUR LIFE?: NO
2. HAVE YOU ACTUALLY HAD ANY THOUGHTS OF KILLING YOURSELF?: NO

## 2024-07-31 NOTE — TELEPHONE ENCOUNTER
Her iron level is borderline low. Please ask her to take OTC iron daily until I see her next time. Per Dr. DAYSI Espinosa staff message.          Patient returned call. Patient notified her iron level is borderline low. Instructed to take OTC iron daily until she see Dr. DAYSI Espinosa at her next appointment. Verbalized understanding. No further questions offered.

## 2024-07-31 NOTE — PROGRESS NOTES
Patient ID: Kisha Vincent is a 68 y.o. female.  Referring Physician: Natividad Nava MD  [address] [facility] [place] [address]  Primary Care Provider: Natividad Nava MD  Referral Reason: Pancytopenia    Subjective:  Feels OK with mild chronic fatigue. Denies hair loss. Denies seeing blood in urine or stool. Denies easy bruising. No recent infecitons. No B symptoms. Has had low Plt count for many years. Has CASEY and had esophageal varices banded before.     Assessment/Plan:  ? Pancytopenia: All counts are mildly decreased. Most pronounced is thrombocytopenia which is chronic since at least 2018. She is asymptomatic. She has known CASEY with esophageal varices indicating portal hypertension. On exam, she has hepatomegaly but not splenomegaly. We will have US with duplex to evaluate for portal hypertension. Most likely her cytopenia are due to cirrhosis + portal hypertension. Will check her vitamins and iron indices as cirrhosis may cause iron deficiency due to portal gastropathy. Check AFP as well. Has never taken PO iron before.     Review Of Systems:  Review of Systems   Constitutional:  Positive for fatigue. Negative for fever.   HENT:  Negative.     Respiratory: Negative.     Cardiovascular: Negative.    Gastrointestinal: Negative.        Physical Exam:  /81 (BP Location: Right arm, Patient Position: Sitting, BP Cuff Size: Adult)   Pulse 68   Temp 36.2 °C (97.2 °F) (Temporal)   Resp 16   Ht 1.524 m (5')   Wt 73.6 kg (162 lb 5.9 oz)   SpO2 97%   BMI 31.71 kg/m²   BSA: 1.77 meters squared  Performance Status: Symptomatic; fully ambulatory  Physical Exam  Constitutional:       Appearance: Normal appearance.   HENT:      Head: Normocephalic and atraumatic.   Eyes:      Pupils: Pupils are equal, round, and reactive to light.   Cardiovascular:      Rate and Rhythm: Normal rate and regular rhythm.   Pulmonary:      Effort: Pulmonary effort is normal.   Abdominal:       General: There is distension.      Palpations: Abdomen is soft.      Comments: Hepatomegaly+. Did not feel spleen   Musculoskeletal:      Right lower leg: No edema.      Left lower leg: No edema.   Lymphadenopathy:      Cervical: No cervical adenopathy.   Skin:     Coloration: Skin is not jaundiced.   Neurological:      General: No focal deficit present.      Mental Status: She is alert and oriented to person, place, and time.         Results:  Diagnostic Results   Lab Results   Component Value Date    WBC 2.9 (L) 07/20/2024    HGB 10.2 (L) 07/20/2024    HCT 35.3 (L) 07/20/2024    MCV 96 07/20/2024    PLT 59 (L) 07/20/2024     Lab Results   Component Value Date    CALCIUM 9.5 07/20/2024     07/20/2024    K 5.1 07/20/2024    CO2 28 07/20/2024     07/20/2024    BUN 19 07/20/2024    CREATININE 1.40 (H) 07/20/2024    ALT 18 05/18/2024    AST 18 05/18/2024       Current Outpatient Medications:     acetaminophen (Tylenol) 500 mg tablet, Take 1 tablet (500 mg) by mouth every 6 hours if needed for mild pain (1 - 3) or fever (temp greater than 38.0 C)., Disp: , Rfl:     albuterol 90 mcg/actuation inhaler, INHALE 1 PUFF BY MOUTH EVERY 4 HOURS AS NEEDED FOR WHEEZING, Disp: 18 g, Rfl: 1    atorvastatin (Lipitor) 40 mg tablet, Take 1 tablet (40 mg) by mouth once daily., Disp: 90 tablet, Rfl: 1    cholecalciferol (Vitamin D-3) 50 mcg (2,000 unit) capsule, Take 1 capsule (50 mcg) by mouth early in the morning.., Disp: , Rfl:     FreeStyle Lite Strips strip, USE TO TEST BLOOD SUGAR ONCE DAILY, Disp: 100 strip, Rfl: 3    gabapentin (Neurontin) 400 mg capsule, Take 1 capsule (400 mg) by mouth 3 times a day., Disp: , Rfl:     losartan (Cozaar) 100 mg tablet, TAKE 1 TABLET(100 MG) BY MOUTH EVERY DAY, Disp: 90 tablet, Rfl: 0    metFORMIN  mg 24 hr tablet, Take 1 tablet (500 mg) by mouth 2 times a day with meals., Disp: 180 tablet, Rfl: 1    nadolol (Corgard) 40 mg tablet, Take 1 tablet (40 mg) by mouth once  daily., Disp: 90 tablet, Rfl: 1    omega-3 fatty acids-fish oil (One-Per-Day Omega-3) 684-1,200 mg capsule, Take by mouth.  Omega 3 1200 MG Oral Capsule, Disp: , Rfl:     Synthroid 200 mcg tablet, Take 1 tablet (200 mcg) by mouth early in the morning.. Take on an empty stomach at the same time each day, either 30 to 60 minutes prior to breakfast, Disp: 30 tablet, Rfl: 5    zinc gluconate 50 mg tablet, Take 1 tablet (50 mg of elemental zinc) by mouth once daily., Disp: , Rfl:     zinc sulfate (Zincate) 220 (50 Zn) MG capsule, Take 1 capsule (50 mg of elemental zinc) by mouth.  Zinc 220 (50 Zn) MG Oral Capsule, Disp: , Rfl:     cyanocobalamin (Vitamin B-12) 1,000 mcg tablet, Take 1 tablet (1,000 mcg) by mouth once daily., Disp: , Rfl:     linaGLIPtin (Tradjenta) 5 mg tablet, Take 1 tablet (5 mg) by mouth once daily., Disp: , Rfl:     NON FORMULARY, CHLORZAOXADONE MUSCLE RELAXER, Disp: , Rfl:      Past Surgical History:   Procedure Laterality Date    CATARACT EXTRACTION  02/15/2018    Cataract Surgery     SECTION, CLASSIC  02/15/2018     Section    OTHER SURGICAL HISTORY  2020    Cervical biopsy procedure colposcopic    TUBAL LIGATION  02/15/2018    Tubal Ligation    US GUIDED PERCUTANEOUS PLACEMENT  8/10/2021    US GUIDED PERCUTANEOUS PLACEMENT [facility]     Family History   Problem Relation Name Age of Onset    Diabetes Mother      Breast cancer Mother  80    Hypertension Father      Lung cancer Father      Thyroid disease Maternal Grandmother      Diabetes Paternal Grandmother      Breast cancer Paternal Grandmother  60      reports that she has quit smoking. Her smoking use included cigarettes. She has never used smokeless tobacco.  Social History     Socioeconomic History    Marital status:      Spouse name: Not on file    Number of children: Not on file    Years of education: Not on file    Highest education level: Not on file   Occupational History    Not on file   Tobacco  Use    Smoking status: Former     Types: Cigarettes    Smokeless tobacco: Never    Tobacco comments:     Quit 2013, over 1 ppd for 40 years   Substance and Sexual Activity    Alcohol use: Never    Drug use: Not on file    Sexual activity: Not on file   Other Topics Concern    Not on file   Social History Narrative    Not on file     Social Determinants of Health     Financial Resource Strain: Not on file   Food Insecurity: Not on file   Transportation Needs: Not on file   Physical Activity: Not on file   Stress: Not on file   Social Connections: Not on file   Intimate Partner Violence: Not on file   Housing Stability: Not on file       Diagnoses and all orders for this visit:  Pancytopenia (Multi)  -     Referral to Hematology and Oncology  -     CBC and Auto Differential; Future  -     Comprehensive Metabolic Panel; Future  -     Lactate Dehydrogenase; Future  -     Vitamin B12; Future  -     Ferritin; Future  -     Folate; Future  -     Iron and TIBC; Future  -     Slide Request; Future  -     Reticulocytes; Future  -     Protime-INR; Future  -     Alpha-Fetoprotein; Future  -     US liver with doppler; Future  -     Clinic Appointment Request; Future  -     CBC and Auto Differential; Future  -     Comprehensive Metabolic Panel; Future  Anemia, unspecified type  -     Ferritin; Future  -     Iron and TIBC; Future  Cirrhosis of liver without ascites, unspecified hepatic cirrhosis type (Multi)  -     Protime-INR; Future  -     US liver with doppler; Future  Alcoholic cirrhosis of liver without ascites (Multi)  -     Alpha-Fetoprotein; Future       I spent more than 45 minutes for the patient today, including face-to-face conversation, pre-visit preparation, post-visit orders, and others.   Abdullahi Espinosa MD

## 2024-08-02 LAB — PATH REVIEW-CBC DIFFERENTIAL: NORMAL

## 2024-08-05 DIAGNOSIS — I10 PRIMARY HYPERTENSION: ICD-10-CM

## 2024-08-05 RX ORDER — LOSARTAN POTASSIUM 100 MG/1
100 TABLET ORAL DAILY
Qty: 90 TABLET | Refills: 0 | Status: SHIPPED | OUTPATIENT
Start: 2024-08-05

## 2024-08-08 ENCOUNTER — HOSPITAL ENCOUNTER (OUTPATIENT)
Dept: RADIOLOGY | Facility: CLINIC | Age: 69
Discharge: HOME | End: 2024-08-08
Payer: MEDICARE

## 2024-08-08 DIAGNOSIS — D61.818 PANCYTOPENIA (MULTI): ICD-10-CM

## 2024-08-08 DIAGNOSIS — K74.60 CIRRHOSIS OF LIVER WITHOUT ASCITES, UNSPECIFIED HEPATIC CIRRHOSIS TYPE (MULTI): ICD-10-CM

## 2024-08-08 PROCEDURE — 76705 ECHO EXAM OF ABDOMEN: CPT | Performed by: RADIOLOGY

## 2024-08-08 PROCEDURE — 93975 VASCULAR STUDY: CPT

## 2024-08-08 PROCEDURE — 93975 VASCULAR STUDY: CPT | Performed by: RADIOLOGY

## 2024-08-21 ENCOUNTER — OFFICE VISIT (OUTPATIENT)
Dept: GASTROENTEROLOGY | Facility: HOSPITAL | Age: 69
End: 2024-08-21
Payer: MEDICARE

## 2024-08-21 VITALS
OXYGEN SATURATION: 94 % | TEMPERATURE: 97.4 F | DIASTOLIC BLOOD PRESSURE: 69 MMHG | BODY MASS INDEX: 31.74 KG/M2 | WEIGHT: 162.5 LBS | SYSTOLIC BLOOD PRESSURE: 104 MMHG | HEART RATE: 59 BPM

## 2024-08-21 DIAGNOSIS — K74.69 OTHER CIRRHOSIS OF LIVER (MULTI): ICD-10-CM

## 2024-08-21 DIAGNOSIS — K74.60 ESOPHAGEAL VARICES IN CIRRHOSIS (MULTI): ICD-10-CM

## 2024-08-21 DIAGNOSIS — K76.0 METABOLIC DYSFUNCTION-ASSOCIATED STEATOTIC LIVER DISEASE (MASLD): Primary | ICD-10-CM

## 2024-08-21 DIAGNOSIS — I85.10 ESOPHAGEAL VARICES IN CIRRHOSIS (MULTI): ICD-10-CM

## 2024-08-21 PROCEDURE — 3074F SYST BP LT 130 MM HG: CPT | Performed by: INTERNAL MEDICINE

## 2024-08-21 PROCEDURE — 3048F LDL-C <100 MG/DL: CPT | Performed by: INTERNAL MEDICINE

## 2024-08-21 PROCEDURE — 3061F NEG MICROALBUMINURIA REV: CPT | Performed by: INTERNAL MEDICINE

## 2024-08-21 PROCEDURE — 1159F MED LIST DOCD IN RCRD: CPT | Performed by: INTERNAL MEDICINE

## 2024-08-21 PROCEDURE — 1125F AMNT PAIN NOTED PAIN PRSNT: CPT | Performed by: INTERNAL MEDICINE

## 2024-08-21 PROCEDURE — 4010F ACE/ARB THERAPY RXD/TAKEN: CPT | Performed by: INTERNAL MEDICINE

## 2024-08-21 PROCEDURE — 99215 OFFICE O/P EST HI 40 MIN: CPT | Performed by: INTERNAL MEDICINE

## 2024-08-21 PROCEDURE — 1160F RVW MEDS BY RX/DR IN RCRD: CPT | Performed by: INTERNAL MEDICINE

## 2024-08-21 PROCEDURE — 3078F DIAST BP <80 MM HG: CPT | Performed by: INTERNAL MEDICINE

## 2024-08-21 PROCEDURE — 3044F HG A1C LEVEL LT 7.0%: CPT | Performed by: INTERNAL MEDICINE

## 2024-08-21 SDOH — ECONOMIC STABILITY: FOOD INSECURITY: WITHIN THE PAST 12 MONTHS, YOU WORRIED THAT YOUR FOOD WOULD RUN OUT BEFORE YOU GOT MONEY TO BUY MORE.: NEVER TRUE

## 2024-08-21 SDOH — ECONOMIC STABILITY: FOOD INSECURITY: WITHIN THE PAST 12 MONTHS, THE FOOD YOU BOUGHT JUST DIDN'T LAST AND YOU DIDN'T HAVE MONEY TO GET MORE.: NEVER TRUE

## 2024-08-21 ASSESSMENT — LIFESTYLE VARIABLES
HOW MANY STANDARD DRINKS CONTAINING ALCOHOL DO YOU HAVE ON A TYPICAL DAY: PATIENT DOES NOT DRINK
AUDIT-C TOTAL SCORE: 0
SKIP TO QUESTIONS 9-10: 1
HOW OFTEN DO YOU HAVE SIX OR MORE DRINKS ON ONE OCCASION: NEVER
HOW OFTEN DO YOU HAVE A DRINK CONTAINING ALCOHOL: NEVER

## 2024-08-21 ASSESSMENT — PATIENT HEALTH QUESTIONNAIRE - PHQ9
2. FEELING DOWN, DEPRESSED OR HOPELESS: NOT AT ALL
SUM OF ALL RESPONSES TO PHQ9 QUESTIONS 1 & 2: 0
1. LITTLE INTEREST OR PLEASURE IN DOING THINGS: NOT AT ALL

## 2024-08-21 ASSESSMENT — PAIN SCALES - GENERAL: PAINLEVEL: 5

## 2024-08-21 NOTE — LETTER
August 27, 2024     Natividad Nava MD  [id number] [facility]  [facility] [address]    Patient: Kisha Vincent   YOB: 1955   Date of Visit: 8/21/2024       Dear Dr. Natividad Nava MD:    Thank you for referring Kisha Vincent to me for evaluation. Below are my notes for this consultation.  If you have questions, please do not hesitate to call me. I look forward to following your patient along with you.       Sincerely,     Perry Foster MD      CC: No Recipients  ______________________________________________________________________________________    Subjective     Cirrhosis, follow up.    History of Present Illness:   Kisha Vincent is a 68 y.o. female who presents to the [facility] to re-establish her liver care.    Last appointment was > 2 years ago (January 27, 2022).  Has developed CKD.  Sees Dr. Kendrick. for Nephrology.    Has had kidney stones (R). Required treatment with ?lithotrypsy.  Dr. Garcia     At her last appointment with me -she had progression of portal hypertension and esophageal varices, which required variceal band ligation.  She had some transient dysphagia after her last banding procedure.  She continues to take nadolol 40 mg daily for prophylaxis and primary prevention.    At that time, her  was recently diagnosed with metastatic colon cancer.  He appears to be doing okay despite his advanced cancer.  The patient was overwhelmed with the care of her  and stopped coming to her hepatology appointments.     She seems to be doing well.  She is lost a lot of weight.  Her mobility seems to be better than it was in the past she denies any decompensating events.  She denies any confusion episodes or hepatic encephalopathy.  She denies any abdominal distention and ascites.    Review of Systems  Review of Systems  Negative ROS stated above.  Past Medical History   has a past medical history of Abdominal distension (gaseous)  (02/13/2020), Osteopetrosis ([facility]), Personal history of other diseases of the nervous system and sense organs, Personal history of other diseases of the nervous system and sense organs, Personal history of other endocrine, nutritional and metabolic disease, Personal history of other endocrine, nutritional and metabolic disease, Personal history of other infectious and parasitic diseases, and Thrombocytopenia, unspecified ([facility]).     Social History   reports that she has quit smoking. Her smoking use included cigarettes. She has never used smokeless tobacco. She reports that she does not currently use alcohol. She reports that she does not use drugs.     Family History  family history includes Breast cancer (age of onset: 60) in her paternal grandmother; Breast cancer (age of onset: 80) in her mother; Diabetes in her mother and paternal grandmother; Hypertension in her father; Lung cancer in her father; Thyroid disease in her maternal grandmother.     Allergies  Allergies   Allergen Reactions   • Latex, Natural Rubber Other and Unknown     sensitivity   • Adhesive Tape-Silicones Rash   • Ampicillin Hives and Rash   • Cefazolin Hives, Rash and Unknown       Medications  Current Outpatient Medications   Medication Instructions   • acetaminophen (TYLENOL) 500 mg, oral, Every 6 hours PRN   • albuterol 90 mcg/actuation inhaler 1 puff, inhalation, Every 4 hours PRN   • atorvastatin (LIPITOR) 40 mg, oral, Daily   • cholecalciferol (VITAMIN D-3) 50 mcg, oral, Daily   • cyanocobalamin (VITAMIN B-12) 1,000 mcg, oral, Daily   • FreeStyle Lite Strips strip USE TO TEST BLOOD SUGAR ONCE DAILY   • gabapentin (NEURONTIN) 400 mg, oral, 3 times daily   • losartan (COZAAR) 100 mg, oral, Daily   • metFORMIN XR (GLUCOPHAGE-XR) 500 mg, oral, 2 times daily (morning and late afternoon)   • nadolol (CORGARD) 40 mg, oral, Daily   • NON FORMULARY CHLORZAOXADONE MUSCLE RELAXER   • omega-3 fatty acids-fish oil (One-Per-Day Omega-3)  "684-1,200 mg capsule oral,  Omega 3 1200 MG Oral Capsule   • Synthroid 200 mcg, oral, Daily, Take on an empty stomach at the same time each day, either 30 to 60 minutes prior to breakfast   • Tradjenta 5 mg, oral, Daily   • vit C/E/Zn/coppr/lutein/zeaxan (PRESERVISION AREDS-2 ORAL) oral   • zinc gluconate 50 mg tablet 50 mg of elemental zinc, oral, Daily   • zinc sulfate (Zincate) 220 (50 Zn) MG capsule 50 mg of elemental zinc, oral,  Zinc 220 (50 Zn) MG Oral Capsule        Objective   Visit Vitals  /69   Pulse 59   Temp 36.3 °C (97.4 °F)          12/6/2022    12:00 AM 7/17/2023    12:00 AM 5/14/2024     3:31 PM 5/16/2024     4:48 PM 7/3/2024     3:21 PM 7/31/2024     9:36 AM 8/21/2024     2:34 PM   Vitals   Systolic 130 124 124  110 130 104   Diastolic 92 80 80  72 81 69   Heart Rate 74 70 65  74 68 59   Temp 36.7 °C (98.1 °F) 36 °C (96.8 °F) 36.5 °C (97.7 °F)   36.2 °C (97.2 °F) 36.3 °C (97.4 °F)   Resp     18 16    Height (in) 1.524 m (5') 1.575 m (5' 2\") 1.575 m (5' 2\") 1.524 m (5') 1.524 m (5') 1.524 m (5')    Weight (lb) 155 150 159 159 157 162.37 162.5   BMI 30.27 kg/m2 27.44 kg/m2 29.08 kg/m2 31.05 kg/m2 30.66 kg/m2 31.71 kg/m2 31.74 kg/m2   BSA (m2) 1.73 m2 1.72 m2 1.78 m2 1.75 m2 1.74 m2 1.77 m2 1.77 m2   Visit Report   Report  Report Report Report     Physical Exam  Constitutional   General appearance: In no acute distress . appears older than stated age.   Eyes   Anicteric Sclerae .   Pulmonary   Auscultation of lungs: Clear.    Cardiovascular   Auscultation of heart: RRR without murmur.     Examination of extremities for edema: Normal.    Abdomen   Soft, non-tender.     Bowel sounds normal.    + hepatomegaly/firm liver edge. No splenomegaly.      Labs    WBC   Date/Time Value Ref Range Status   07/31/2024 10:09 AM 4.2 (L) 4.4 - 11.3 x10*3/uL Final     Hemoglobin   Date/Time Value Ref Range Status   07/31/2024 10:09 AM 11.2 (L) 12.0 - 16.0 g/dL Final     Hematocrit   Date/Time Value Ref Range " Status   07/31/2024 10:09 AM 36.6 36.0 - 46.0 % Final     MCV   Date/Time Value Ref Range Status   07/31/2024 10:09 AM 92 80 - 100 fL Final     Platelets   Date/Time Value Ref Range Status   07/31/2024 10:09 AM 68 (L) 150 - 450 x10*3/uL Final        Total Protein   Date/Time Value Ref Range Status   07/31/2024 10:09 AM 7.0 6.4 - 8.2 g/dL Final     Albumin   Date/Time Value Ref Range Status   07/31/2024 10:09 AM 4.2 3.4 - 5.0 g/dL Final     AST   Date/Time Value Ref Range Status   07/31/2024 10:09 AM 22 9 - 39 U/L Final     ALT   Date/Time Value Ref Range Status   07/31/2024 10:09 AM 24 7 - 45 U/L Final     Comment:     Patients treated with Sulfasalazine may generate falsely decreased results for ALT.     Alkaline Phosphatase   Date/Time Value Ref Range Status   07/31/2024 10:09  (H) 33 - 136 U/L Final     Bilirubin, Total   Date/Time Value Ref Range Status   07/31/2024 10:09 AM 0.6 0.0 - 1.2 mg/dL Final     Bilirubin, Direct   Date/Time Value Ref Range Status   05/28/2022 10:00 AM 0.1 0.0 - 0.3 mg/dL Final     GGT   Date/Time Value Ref Range Status   01/04/2018 04:07  (H) 5 - 55 U/L Final     Comment:     Performed at [place] [address]        Vitamin D, 25-Hydroxy, Total   Date/Time Value Ref Range Status   05/18/2024 10:54 AM 44 30 - 100 ng/mL Final      AST   Date/Time Value Ref Range Status   07/31/2024 10:09 AM 22 9 - 39 U/L Final     ALT   Date/Time Value Ref Range Status   07/31/2024 10:09 AM 24 7 - 45 U/L Final     Comment:     Patients treated with Sulfasalazine may generate falsely decreased results for ALT.     Alkaline Phosphatase   Date/Time Value Ref Range Status   07/31/2024 10:09  (H) 33 - 136 U/L Final     Bilirubin, Total   Date/Time Value Ref Range Status   07/31/2024 10:09 AM 0.6 0.0 - 1.2 mg/dL Final     Bilirubin, Direct   Date/Time Value Ref Range Status   05/28/2022 10:00 AM 0.1 0.0 - 0.3 mg/dL Final     GGT   Date/Time Value Ref Range Status    01/04/2018 04:07  (H) 5 - 55 U/L Final     Comment:     Performed at [address] [address]     Albumin   Date/Time Value Ref Range Status   07/31/2024 10:09 AM 4.2 3.4 - 5.0 g/dL Final     Total Protein   Date/Time Value Ref Range Status   07/31/2024 10:09 AM 7.0 6.4 - 8.2 g/dL Final        Protime   Date/Time Value Ref Range Status   07/31/2024 10:09 AM 11.2 9.8 - 12.8 seconds Final     INR   Date/Time Value Ref Range Status   07/31/2024 10:09 AM 1.0 0.9 - 1.1 Final     LIVER US with Dopplers  8/8/2024    IMPRESSION:  Borderline size of the liver. Diffuse hepatocellular disease changes may represent a combination of fatty infiltration and cirrhosis. No focal hepatic lesion demonstrated.      Patency of the hepatic vasculature with antegrade flow throughout.      Splenomegaly and enlarged main portal vein compatible with portal hypertension.      Trace perihepatic free fluid.      Nonspecific gallbladder wall thickening. No demonstrated gallstones or biliary dilation.    EGD  2021  Impression:      - Normal hypopharynx.                         - Z-line regular, 35 cm from the incisors.                         - Grade II esophageal varices with no stigmata of recent bleeding. Incompletely eradicated. Banded.                         - Portal hypertensive gastropathy.                         - Normal duodenal bulb, first portion of th duodenum  and second portion of the duodenum.                          MELD 3.0: 11 at 7/31/2024 10:09 AM  MELD-Na: 10 at 7/31/2024 10:09 AM  Calculated from:  Serum Creatinine: 1.40 mg/dL at 7/31/2024 10:09 AM  Serum Sodium: 139 mmol/L (Using max of 137 mmol/L) at 7/31/2024 10:09 AM  Total Bilirubin: 0.6 mg/dL (Using min of 1 mg/dL) at 7/31/2024 10:09 AM  Serum Albumin: 4.2 g/dL (Using max of 3.5 g/dL) at 7/31/2024 10:09 AM  INR(ratio): 1.0 at 7/31/2024 10:09 AM  Age at listing (hypothetical): 68 years  Sex: Female at 7/31/2024 10:09 AM    Assessment/Plan    Kisha Vincent is a 68 y.o. female who presents to Liver clinic to re-establish care.    MASLD - cirrhosis ( confirmed by elastography).   She has never had a liver biopsy.     She has esophageal varices on endoscopy, but no history of decompensation. She has abdominal distention, although in the past she's never had ascites.     Normal hepatic synthetic function.     She did develop sepsis and pneumonia in 2018.     Now with CKD.     Plan:  We did reinforced general recommendations for management of cirrhosis:  - We will acquire liver cancer screening with ultrasound (which will assess her gallbladder as well) and alpha-fetoprotein  - Regular lab work to monitor liver function.  - We will schedule her for follow-up endoscopy.  - Continue nadolol.   There is no need for liver transplant consideration at this time - due to stable liver function.    Instructions      Perry Foster MD

## 2024-08-21 NOTE — PATIENT INSTRUCTIONS
Welcome to Dr. Perry Foster's [facility].  Dr. Foster sees patients at the following sites:  [place] [address] [facility] at [facility] [address], [address] at [facility], [facility] [address]    Dr. Foster's hepatology care coordinator, Ruba JEAN-BAPTISTE, can be reached at [phone].  Dr. Foster's , Kelley Carter, can be reached at [phone].     Happy to see you back in Liver Clinic.    Please schedule an endoscopy.    Continue nadolol.    Labwork and liver ultrasound in 6 months.

## 2024-08-21 NOTE — PROGRESS NOTES
Subjective     Cirrhosis, follow up.    History of Present Illness:   Kisha Vincent is a 68 y.o. female who presents to the [facility] to re-establish her liver care.    Last appointment was > 2 years ago (January 27, 2022).  Has developed CKD.  Sees Dr. Kendrick. for Nephrology.    Has had kidney stones (R). Required treatment with ?lithotrypsy.  Dr. Garcia     At her last appointment with me -she had progression of portal hypertension and esophageal varices, which required variceal band ligation.  She had some transient dysphagia after her last banding procedure.  She continues to take nadolol 40 mg daily for prophylaxis and primary prevention.    At that time, her  was recently diagnosed with metastatic colon cancer.  He appears to be doing okay despite his advanced cancer.  The patient was overwhelmed with the care of her  and stopped coming to her hepatology appointments.     She seems to be doing well.  She is lost a lot of weight.  Her mobility seems to be better than it was in the past she denies any decompensating events.  She denies any confusion episodes or hepatic encephalopathy.  She denies any abdominal distention and ascites.    Review of Systems  Review of Systems  Negative ROS stated above.  Past Medical History   has a past medical history of Abdominal distension (gaseous) (02/13/2020), Osteopetrosis ([facility]), Personal history of other diseases of the nervous system and sense organs, Personal history of other diseases of the nervous system and sense organs, Personal history of other endocrine, nutritional and metabolic disease, Personal history of other endocrine, nutritional and metabolic disease, Personal history of other infectious and parasitic diseases, and Thrombocytopenia, unspecified ([facility]).     Social History   reports that she has quit smoking. Her smoking use included cigarettes. She has never used smokeless tobacco. She reports that she does not currently use  alcohol. She reports that she does not use drugs.     Family History  family history includes Breast cancer (age of onset: 60) in her paternal grandmother; Breast cancer (age of onset: 80) in her mother; Diabetes in her mother and paternal grandmother; Hypertension in her father; Lung cancer in her father; Thyroid disease in her maternal grandmother.     Allergies  Allergies   Allergen Reactions    Latex, Natural Rubber Other and Unknown     sensitivity    Adhesive Tape-Silicones Rash    Ampicillin Hives and Rash    Cefazolin Hives, Rash and Unknown       Medications  Current Outpatient Medications   Medication Instructions    acetaminophen (TYLENOL) 500 mg, oral, Every 6 hours PRN    albuterol 90 mcg/actuation inhaler 1 puff, inhalation, Every 4 hours PRN    atorvastatin (LIPITOR) 40 mg, oral, Daily    cholecalciferol (VITAMIN D-3) 50 mcg, oral, Daily    cyanocobalamin (VITAMIN B-12) 1,000 mcg, oral, Daily    FreeStyle Lite Strips strip USE TO TEST BLOOD SUGAR ONCE DAILY    gabapentin (NEURONTIN) 400 mg, oral, 3 times daily    losartan (COZAAR) 100 mg, oral, Daily    metFORMIN XR (GLUCOPHAGE-XR) 500 mg, oral, 2 times daily (morning and late afternoon)    nadolol (CORGARD) 40 mg, oral, Daily    NON FORMULARY CHLORZAOXADONE MUSCLE RELAXER    omega-3 fatty acids-fish oil (One-Per-Day Omega-3) 684-1,200 mg capsule oral,  Omega 3 1200 MG Oral Capsule    Synthroid 200 mcg, oral, Daily, Take on an empty stomach at the same time each day, either 30 to 60 minutes prior to breakfast    Tradjenta 5 mg, oral, Daily    vit C/E/Zn/coppr/lutein/zeaxan (PRESERVISION AREDS-2 ORAL) oral    zinc gluconate 50 mg tablet 50 mg of elemental zinc, oral, Daily    zinc sulfate (Zincate) 220 (50 Zn) MG capsule 50 mg of elemental zinc, oral,  Zinc 220 (50 Zn) MG Oral Capsule        Objective   Visit Vitals  /69   Pulse 59   Temp 36.3 °C (97.4 °F)          12/6/2022    12:00 AM 7/17/2023    12:00 AM 5/14/2024     3:31 PM 5/16/2024      "4:48 PM 7/3/2024     3:21 PM 7/31/2024     9:36 AM 8/21/2024     2:34 PM   Vitals   Systolic 130 124 124  110 130 104   Diastolic 92 80 80  72 81 69   Heart Rate 74 70 65  74 68 59   Temp 36.7 °C (98.1 °F) 36 °C (96.8 °F) 36.5 °C (97.7 °F)   36.2 °C (97.2 °F) 36.3 °C (97.4 °F)   Resp     18 16    Height (in) 1.524 m (5') 1.575 m (5' 2\") 1.575 m (5' 2\") 1.524 m (5') 1.524 m (5') 1.524 m (5')    Weight (lb) 155 150 159 159 157 162.37 162.5   BMI 30.27 kg/m2 27.44 kg/m2 29.08 kg/m2 31.05 kg/m2 30.66 kg/m2 31.71 kg/m2 31.74 kg/m2   BSA (m2) 1.73 m2 1.72 m2 1.78 m2 1.75 m2 1.74 m2 1.77 m2 1.77 m2   Visit Report   Report  Report Report Report     Physical Exam  Constitutional   General appearance: In no acute distress . appears older than stated age.   Eyes   Anicteric Sclerae .   Pulmonary   Auscultation of lungs: Clear.    Cardiovascular   Auscultation of heart: RRR without murmur.     Examination of extremities for edema: Normal.    Abdomen   Soft, non-tender.     Bowel sounds normal.    + hepatomegaly/firm liver edge. No splenomegaly.      Labs    WBC   Date/Time Value Ref Range Status   07/31/2024 10:09 AM 4.2 (L) 4.4 - 11.3 x10*3/uL Final     Hemoglobin   Date/Time Value Ref Range Status   07/31/2024 10:09 AM 11.2 (L) 12.0 - 16.0 g/dL Final     Hematocrit   Date/Time Value Ref Range Status   07/31/2024 10:09 AM 36.6 36.0 - 46.0 % Final     MCV   Date/Time Value Ref Range Status   07/31/2024 10:09 AM 92 80 - 100 fL Final     Platelets   Date/Time Value Ref Range Status   07/31/2024 10:09 AM 68 (L) 150 - 450 x10*3/uL Final        Total Protein   Date/Time Value Ref Range Status   07/31/2024 10:09 AM 7.0 6.4 - 8.2 g/dL Final     Albumin   Date/Time Value Ref Range Status   07/31/2024 10:09 AM 4.2 3.4 - 5.0 g/dL Final     AST   Date/Time Value Ref Range Status   07/31/2024 10:09 AM 22 9 - 39 U/L Final     ALT   Date/Time Value Ref Range Status   07/31/2024 10:09 AM 24 7 - 45 U/L Final     Comment:     Patients treated " with Sulfasalazine may generate falsely decreased results for ALT.     Alkaline Phosphatase   Date/Time Value Ref Range Status   07/31/2024 10:09  (H) 33 - 136 U/L Final     Bilirubin, Total   Date/Time Value Ref Range Status   07/31/2024 10:09 AM 0.6 0.0 - 1.2 mg/dL Final     Bilirubin, Direct   Date/Time Value Ref Range Status   05/28/2022 10:00 AM 0.1 0.0 - 0.3 mg/dL Final     GGT   Date/Time Value Ref Range Status   01/04/2018 04:07  (H) 5 - 55 U/L Final     Comment:     Performed at [place] [id number]        Vitamin D, 25-Hydroxy, Total   Date/Time Value Ref Range Status   05/18/2024 10:54 AM 44 30 - 100 ng/mL Final      AST   Date/Time Value Ref Range Status   07/31/2024 10:09 AM 22 9 - 39 U/L Final     ALT   Date/Time Value Ref Range Status   07/31/2024 10:09 AM 24 7 - 45 U/L Final     Comment:     Patients treated with Sulfasalazine may generate falsely decreased results for ALT.     Alkaline Phosphatase   Date/Time Value Ref Range Status   07/31/2024 10:09  (H) 33 - 136 U/L Final     Bilirubin, Total   Date/Time Value Ref Range Status   07/31/2024 10:09 AM 0.6 0.0 - 1.2 mg/dL Final     Bilirubin, Direct   Date/Time Value Ref Range Status   05/28/2022 10:00 AM 0.1 0.0 - 0.3 mg/dL Final     GGT   Date/Time Value Ref Range Status   01/04/2018 04:07  (H) 5 - 55 U/L Final     Comment:     Performed at [address] [address]     Albumin   Date/Time Value Ref Range Status   07/31/2024 10:09 AM 4.2 3.4 - 5.0 g/dL Final     Total Protein   Date/Time Value Ref Range Status   07/31/2024 10:09 AM 7.0 6.4 - 8.2 g/dL Final        Protime   Date/Time Value Ref Range Status   07/31/2024 10:09 AM 11.2 9.8 - 12.8 seconds Final     INR   Date/Time Value Ref Range Status   07/31/2024 10:09 AM 1.0 0.9 - 1.1 Final     LIVER US with Dopplers  8/8/2024    IMPRESSION:  Borderline size of the liver. Diffuse hepatocellular disease changes may represent a  combination of fatty infiltration and cirrhosis. No focal hepatic lesion demonstrated.      Patency of the hepatic vasculature with antegrade flow throughout.      Splenomegaly and enlarged main portal vein compatible with portal hypertension.      Trace perihepatic free fluid.      Nonspecific gallbladder wall thickening. No demonstrated gallstones or biliary dilation.    EGD  2021  Impression:      - Normal hypopharynx.                         - Z-line regular, 35 cm from the incisors.                         - Grade II esophageal varices with no stigmata of recent bleeding. Incompletely eradicated. Banded.                         - Portal hypertensive gastropathy.                         - Normal duodenal bulb, first portion of th duodenum  and second portion of the duodenum.                          MELD 3.0: 11 at 7/31/2024 10:09 AM  MELD-Na: 10 at 7/31/2024 10:09 AM  Calculated from:  Serum Creatinine: 1.40 mg/dL at 7/31/2024 10:09 AM  Serum Sodium: 139 mmol/L (Using max of 137 mmol/L) at 7/31/2024 10:09 AM  Total Bilirubin: 0.6 mg/dL (Using min of 1 mg/dL) at 7/31/2024 10:09 AM  Serum Albumin: 4.2 g/dL (Using max of 3.5 g/dL) at 7/31/2024 10:09 AM  INR(ratio): 1.0 at 7/31/2024 10:09 AM  Age at listing (hypothetical): 68 years  Sex: Female at 7/31/2024 10:09 AM    Assessment/Plan   Kisha Vincent is a 68 y.o. female who presents to Liver clinic to re-establish care.    MASLD - cirrhosis ( confirmed by elastography).   She has never had a liver biopsy.     She has esophageal varices on endoscopy, but no history of decompensation. She has abdominal distention, although in the past she's never had ascites.     Normal hepatic synthetic function.     She did develop sepsis and pneumonia in 2018.     Now with CKD.     Plan:  We did reinforced general recommendations for management of cirrhosis:  - We will acquire liver cancer screening with ultrasound (which will assess her gallbladder as well) and  alpha-fetoprotein  - Regular lab work to monitor liver function.  - We will schedule her for follow-up endoscopy.  - Continue nadolol.   There is no need for liver transplant consideration at this time - due to stable liver function.    Instructions      Perry Foster MD

## 2024-08-24 DIAGNOSIS — E03.9 HYPOTHYROIDISM, UNSPECIFIED TYPE: Primary | ICD-10-CM

## 2024-08-24 DIAGNOSIS — Z76.0 MEDICATION REFILL: ICD-10-CM

## 2024-08-25 DIAGNOSIS — Z76.0 MEDICATION REFILL: ICD-10-CM

## 2024-08-26 RX ORDER — NADOLOL 40 MG/1
40 TABLET ORAL DAILY
Qty: 90 TABLET | Refills: 1 | Status: SHIPPED | OUTPATIENT
Start: 2024-08-26 | End: 2024-08-30 | Stop reason: WASHOUT

## 2024-08-26 RX ORDER — ATORVASTATIN CALCIUM 40 MG/1
40 TABLET, FILM COATED ORAL DAILY
Qty: 90 TABLET | Refills: 1 | Status: SHIPPED | OUTPATIENT
Start: 2024-08-26

## 2024-08-26 RX ORDER — METFORMIN HYDROCHLORIDE 500 MG/1
500 TABLET, EXTENDED RELEASE ORAL
Qty: 180 TABLET | Refills: 1 | Status: SHIPPED | OUTPATIENT
Start: 2024-08-26 | End: 2024-08-30

## 2024-08-26 NOTE — TELEPHONE ENCOUNTER
DEVAN/CM Discharge Plan  Informed patient is ready for discharge. Patient’s discharge destination is  Home with home oxygen. Patient to be picked up by  .  Patient/interested person has been counseled for post hospitalization care.   . Initial implementation of the patient’s discharge plan has been arranged, including any devices/equipment needed for discharge. Discharge plan communicated to pt,rn,md  After Visit Summary - Transition Report Information  Receiving Agency/Facility: [facility]   Receiving Agency/Facility phone number: [phone]  Receiving Agency/Facility Comment: we will provide O2 tank for dc home ,when you arrive home call the 800 number for the delivery of the concentrater   Requesting refill on populated rx - last OV 5/14/2024.   Also stated she was supposed to have blood work orders in the system but she went to the lab and they did not have any open orders for her. Please advise if orders needed to be placed.

## 2024-08-27 ENCOUNTER — TELEPHONE (OUTPATIENT)
Dept: PRIMARY CARE | Facility: CLINIC | Age: 69
End: 2024-08-27
Payer: MEDICARE

## 2024-08-27 NOTE — TELEPHONE ENCOUNTER
Rx sent.  It looks like she is due for repeat thyroid testing - ordered.    Of note, she also has several open lab orders from GI specialist (Dr. Perry Foster)

## 2024-08-27 NOTE — TELEPHONE ENCOUNTER
Patient left message asking if her tsh lab is in the system - patient needs to be notified it is and she can report to the lab.

## 2024-08-30 ENCOUNTER — LAB (OUTPATIENT)
Dept: LAB | Facility: LAB | Age: 69
End: 2024-08-30
Payer: MEDICARE

## 2024-08-30 ENCOUNTER — TELEPHONE (OUTPATIENT)
Dept: GASTROENTEROLOGY | Facility: CLINIC | Age: 69
End: 2024-08-30

## 2024-08-30 ENCOUNTER — ANESTHESIA EVENT (OUTPATIENT)
Dept: GASTROENTEROLOGY | Facility: HOSPITAL | Age: 69
End: 2024-08-30
Payer: MEDICARE

## 2024-08-30 ENCOUNTER — ANESTHESIA (OUTPATIENT)
Dept: GASTROENTEROLOGY | Facility: HOSPITAL | Age: 69
End: 2024-08-30
Payer: MEDICARE

## 2024-08-30 ENCOUNTER — HOSPITAL ENCOUNTER (OUTPATIENT)
Dept: GASTROENTEROLOGY | Facility: HOSPITAL | Age: 69
Discharge: HOME | End: 2024-08-30
Payer: MEDICARE

## 2024-08-30 VITALS
OXYGEN SATURATION: 99 % | RESPIRATION RATE: 19 BRPM | HEART RATE: 71 BPM | SYSTOLIC BLOOD PRESSURE: 123 MMHG | TEMPERATURE: 99.1 F | DIASTOLIC BLOOD PRESSURE: 88 MMHG

## 2024-08-30 DIAGNOSIS — K76.0 METABOLIC DYSFUNCTION-ASSOCIATED STEATOTIC LIVER DISEASE (MASLD): ICD-10-CM

## 2024-08-30 DIAGNOSIS — K74.60 ESOPHAGEAL VARICES IN CIRRHOSIS (MULTI): ICD-10-CM

## 2024-08-30 DIAGNOSIS — K74.60 ESOPHAGEAL VARICES IN CIRRHOSIS (MULTI): Primary | ICD-10-CM

## 2024-08-30 DIAGNOSIS — Z76.0 MEDICATION REFILL: ICD-10-CM

## 2024-08-30 DIAGNOSIS — K74.69 OTHER CIRRHOSIS OF LIVER (MULTI): ICD-10-CM

## 2024-08-30 DIAGNOSIS — E03.9 HYPOTHYROIDISM, UNSPECIFIED TYPE: ICD-10-CM

## 2024-08-30 DIAGNOSIS — I85.10 ESOPHAGEAL VARICES IN CIRRHOSIS (MULTI): ICD-10-CM

## 2024-08-30 DIAGNOSIS — I85.10 ESOPHAGEAL VARICES IN CIRRHOSIS (MULTI): Primary | ICD-10-CM

## 2024-08-30 PROBLEM — E11.21 DIABETIC NEPHROPATHY (MULTI): Status: RESOLVED | Noted: 2024-08-30 | Resolved: 2024-08-30

## 2024-08-30 PROBLEM — N20.0 NEPHROLITHIASIS: Status: ACTIVE | Noted: 2024-08-30

## 2024-08-30 PROBLEM — N18.9 CHRONIC RENAL INSUFFICIENCY: Status: ACTIVE | Noted: 2024-08-30

## 2024-08-30 PROBLEM — E11.21 DIABETIC NEPHROPATHY (MULTI): Status: ACTIVE | Noted: 2024-08-30

## 2024-08-30 LAB
GLUCOSE BLD MANUAL STRIP-MCNC: 106 MG/DL (ref 74–99)
T4 FREE SERPL-MCNC: 1.1 NG/DL (ref 0.78–1.48)
TSH SERPL-ACNC: 12 MIU/L (ref 0.44–3.98)

## 2024-08-30 PROCEDURE — 84443 ASSAY THYROID STIM HORMONE: CPT

## 2024-08-30 PROCEDURE — 36415 COLL VENOUS BLD VENIPUNCTURE: CPT

## 2024-08-30 PROCEDURE — 7100000010 HC PHASE TWO TIME - EACH INCREMENTAL 1 MINUTE

## 2024-08-30 PROCEDURE — 3700000001 HC GENERAL ANESTHESIA TIME - INITIAL BASE CHARGE

## 2024-08-30 PROCEDURE — 2500000004 HC RX 250 GENERAL PHARMACY W/ HCPCS (ALT 636 FOR OP/ED)

## 2024-08-30 PROCEDURE — 82947 ASSAY GLUCOSE BLOOD QUANT: CPT

## 2024-08-30 PROCEDURE — 3700000002 HC GENERAL ANESTHESIA TIME - EACH INCREMENTAL 1 MINUTE

## 2024-08-30 PROCEDURE — 43244 EGD VARICES LIGATION: CPT | Performed by: INTERNAL MEDICINE

## 2024-08-30 PROCEDURE — 2720000007 HC OR 272 NO HCPCS

## 2024-08-30 PROCEDURE — 84439 ASSAY OF FREE THYROXINE: CPT

## 2024-08-30 PROCEDURE — 7100000009 HC PHASE TWO TIME - INITIAL BASE CHARGE

## 2024-08-30 RX ORDER — ONDANSETRON HYDROCHLORIDE 2 MG/ML
4 INJECTION, SOLUTION INTRAVENOUS ONCE AS NEEDED
OUTPATIENT
Start: 2024-08-30

## 2024-08-30 RX ORDER — LIDOCAINE HYDROCHLORIDE 10 MG/ML
0.1 INJECTION, SOLUTION EPIDURAL; INFILTRATION; INTRACAUDAL; PERINEURAL ONCE
OUTPATIENT
Start: 2024-08-30 | End: 2024-08-30

## 2024-08-30 RX ORDER — ACETAMINOPHEN 325 MG/1
650 TABLET ORAL EVERY 4 HOURS PRN
OUTPATIENT
Start: 2024-08-30

## 2024-08-30 RX ORDER — SODIUM CHLORIDE, SODIUM LACTATE, POTASSIUM CHLORIDE, CALCIUM CHLORIDE 600; 310; 30; 20 MG/100ML; MG/100ML; MG/100ML; MG/100ML
100 INJECTION, SOLUTION INTRAVENOUS CONTINUOUS
OUTPATIENT
Start: 2024-08-30

## 2024-08-30 RX ORDER — PROPOFOL 10 MG/ML
INJECTION, EMULSION INTRAVENOUS AS NEEDED
Status: DISCONTINUED | OUTPATIENT
Start: 2024-08-30 | End: 2024-08-30

## 2024-08-30 RX ORDER — METFORMIN HYDROCHLORIDE 500 MG/1
500 TABLET, EXTENDED RELEASE ORAL
Qty: 180 TABLET | Refills: 1 | Status: SHIPPED | OUTPATIENT
Start: 2024-08-30

## 2024-08-30 RX ORDER — CARVEDILOL 6.25 MG/1
6.25 TABLET ORAL 2 TIMES DAILY
Qty: 60 TABLET | Refills: 11 | Status: SHIPPED | OUTPATIENT
Start: 2024-08-30 | End: 2025-08-30

## 2024-08-30 ASSESSMENT — PAIN SCALES - GENERAL
PAINLEVEL_OUTOF10: 0 - NO PAIN
PAIN_LEVEL: 0

## 2024-08-30 ASSESSMENT — COLUMBIA-SUICIDE SEVERITY RATING SCALE - C-SSRS
6. HAVE YOU EVER DONE ANYTHING, STARTED TO DO ANYTHING, OR PREPARED TO DO ANYTHING TO END YOUR LIFE?: NO
2. HAVE YOU ACTUALLY HAD ANY THOUGHTS OF KILLING YOURSELF?: NO
1. IN THE PAST MONTH, HAVE YOU WISHED YOU WERE DEAD OR WISHED YOU COULD GO TO SLEEP AND NOT WAKE UP?: NO

## 2024-08-30 ASSESSMENT — ENCOUNTER SYMPTOMS
ARTHRALGIAS: 0
COUGH: 0
WHEEZING: 0
DIZZINESS: 0
ABDOMINAL PAIN: 0
SLEEP DISTURBANCE: 0
NAUSEA: 0
VOMITING: 0
CHILLS: 0
UNEXPECTED WEIGHT CHANGE: 0
JOINT SWELLING: 0
CONFUSION: 0
DIFFICULTY URINATING: 0
ABDOMINAL DISTENTION: 0
COLOR CHANGE: 0
LIGHT-HEADEDNESS: 0
FEVER: 0
CONSTIPATION: 0
DIARRHEA: 0
SPEECH DIFFICULTY: 0
TROUBLE SWALLOWING: 0
HEADACHES: 0
SHORTNESS OF BREATH: 0

## 2024-08-30 ASSESSMENT — PAIN - FUNCTIONAL ASSESSMENT
PAIN_FUNCTIONAL_ASSESSMENT: 0-10

## 2024-08-30 NOTE — SIGNIFICANT EVENT
Dr. Foster at bedside for bedside report. Swallow eval. Pt tolerated water. Dr. Foster recommending soft foods and liquids tonight

## 2024-08-30 NOTE — TELEPHONE ENCOUNTER
Pt left a second VM requesting metFORMIN  mg 24 hr tablet     Pharm confirmed.  Rx pop.  Last O.V. 5/14/24

## 2024-08-30 NOTE — ANESTHESIA PREPROCEDURE EVALUATION
Patient: Kisha Vincent    Procedure Information       Date/Time: 08/30/24 1320    Scheduled providers: Perry Foster MD    Procedure: EGD    Location: [facility]            Relevant Problems   Anesthesia (within normal limits)      Cardiac   (+) Chest pain   (+) Hyperlipidemia   (+) Primary hypertension      Pulmonary   (+) Dyspnea on minimal exertion   (+) Obstructive sleep apnea syndrome (possible)      Neuro   (+) Diabetic polyneuropathy associated with type 2 diabetes mellitus (Multi)   (+) Lumbago with sciatica, right side   (+) Meralgia paresthetica of right side   (+) Radiculopathy, lumbar region      GI   (+) Esophageal varices (Multi)      /Renal   (+) Chronic renal insufficiency   (+) Nephrolithiasis      Liver   (+) Chronic cholecystitis   (+) Cirrhosis of liver (Multi)   (+) Hepatomegaly   (+) NAFLD (nonalcoholic fatty liver disease)      Endocrine   (+) Diabetes mellitus, type 2 (Multi) (106)   (+) Diabetic nephropathy (Multi) (Resolved)   (+) Diabetic polyneuropathy associated with type 2 diabetes mellitus (Multi)   (+) Hypothyroidism   (+) Type 2 diabetes mellitus with diabetic neuropathy, unspecified (Multi)      Hematology   (+) Absolute anemia   (+) Essential thrombocythemia (Multi) (7/13/24   68)   (+) Thrombocytopenia (CMS-HCC)      Musculoskeletal   (+) DDD (degenerative disc disease), lumbar      HEENT (within normal limits)      ID   (+) Empyema of pleura (Multi)      Skin   (+) Maculopapular rash      GYN (within normal limits)       Clinical information reviewed:                   NPO Detail:  No data recorded     Physical Exam    Airway  Mallampati: III  TM distance: >3 FB     Cardiovascular - normal exam     Dental   Comments: Poor chipped   Pulmonary - normal exam     Abdominal          Vitals:    08/30/24 1326   BP: 145/81   Pulse: 70   Resp: 19   Temp: 37.3 °C (99.1 °F)   SpO2: 95%       Past Surgical History:   Procedure Laterality Date    CATARACT EXTRACTION   02/15/2018    Cataract Surgery     SECTION, CLASSIC  02/15/2018     Section    OTHER SURGICAL HISTORY  2020    Cervical biopsy procedure colposcopic    TUBAL LIGATION  02/15/2018    Tubal Ligation    US GUIDED PERCUTANEOUS PLACEMENT  8/10/2021    US GUIDED PERCUTANEOUS PLACEMENT [facility]     Past Medical History:   Diagnosis Date    Abdominal distension (gaseous) 2020    Abdominal distention    Osteopetrosis (HHS-HCC)     Osteopetrosis    Personal history of other diseases of the nervous system and sense organs     History of neuropathy    Personal history of other diseases of the nervous system and sense organs     History of sleep apnea    Personal history of other endocrine, nutritional and metabolic disease     History of hypothyroidism    Personal history of other endocrine, nutritional and metabolic disease     History of type 2 diabetes mellitus    Personal history of other infectious and parasitic diseases     History of sepsis    Thrombocytopenia, unspecified (CMS-HCC)     Temporary low platelet count       Current Outpatient Medications:     acetaminophen (Tylenol) 500 mg tablet, Take 1 tablet (500 mg) by mouth every 6 hours if needed for mild pain (1 - 3) or fever (temp greater than 38.0 C)., Disp: , Rfl:     albuterol 90 mcg/actuation inhaler, INHALE 1 PUFF BY MOUTH EVERY 4 HOURS AS NEEDED FOR WHEEZING, Disp: 18 g, Rfl: 1    cholecalciferol (Vitamin D-3) 50 mcg (2,000 unit) capsule, Take 1 capsule (50 mcg) by mouth early in the morning.., Disp: , Rfl:     FreeStyle Lite Strips strip, USE TO TEST BLOOD SUGAR ONCE DAILY, Disp: 100 strip, Rfl: 3    losartan (Cozaar) 100 mg tablet, Take 1 tablet (100 mg) by mouth once daily., Disp: 90 tablet, Rfl: 0    nadolol (Corgard) 40 mg tablet, TAKE 1 TABLET(40 MG) BY MOUTH DAILY, Disp: 90 tablet, Rfl: 1    omega-3 fatty acids-fish oil (One-Per-Day Omega-3) 684-1,200 mg capsule, Take by mouth.  Omega 3 1200 MG Oral Capsule, Disp:  , Rfl:     Synthroid 200 mcg tablet, Take 1 tablet (200 mcg) by mouth early in the morning.. Take on an empty stomach at the same time each day, either 30 to 60 minutes prior to breakfast, Disp: 30 tablet, Rfl: 5    zinc gluconate 50 mg tablet, Take 1 tablet (50 mg of elemental zinc) by mouth once daily., Disp: , Rfl:     zinc sulfate (Zincate) 220 (50 Zn) MG capsule, Take 1 capsule (50 mg of elemental zinc) by mouth.  Zinc 220 (50 Zn) MG Oral Capsule, Disp: , Rfl:     atorvastatin (Lipitor) 40 mg tablet, TAKE 1 TABLET(40 MG) BY MOUTH DAILY (Patient not taking: Reported on 8/30/2024), Disp: 90 tablet, Rfl: 1    cyanocobalamin (Vitamin B-12) 1,000 mcg tablet, Take 1 tablet (1,000 mcg) by mouth once daily., Disp: , Rfl:     gabapentin (Neurontin) 400 mg capsule, Take 1 capsule (400 mg) by mouth 3 times a day., Disp: , Rfl:     linaGLIPtin (Tradjenta) 5 mg tablet, Take 1 tablet (5 mg) by mouth once daily., Disp: , Rfl:     metFORMIN  mg 24 hr tablet, TAKE 1 TABLET(500 MG) BY MOUTH TWICE DAILY WITH MEALS, Disp: 180 tablet, Rfl: 1    vit C/E/Zn/coppr/lutein/zeaxan (PRESERVISION AREDS-2 ORAL), Take by mouth., Disp: , Rfl:   Prior to Admission medications    Medication Sig Start Date End Date Taking? Authorizing Provider   acetaminophen (Tylenol) 500 mg tablet Take 1 tablet (500 mg) by mouth every 6 hours if needed for mild pain (1 - 3) or fever (temp greater than 38.0 C).   Yes Historical Provider, MD   albuterol 90 mcg/actuation inhaler INHALE 1 PUFF BY MOUTH EVERY 4 HOURS AS NEEDED FOR WHEEZING 7/30/24  Yes Natividad Nava MD   cholecalciferol (Vitamin D-3) 50 mcg (2,000 unit) capsule Take 1 capsule (50 mcg) by mouth early in the morning..   Yes Historical Provider, MD   FreeStyle Lite Strips strip USE TO TEST BLOOD SUGAR ONCE DAILY 7/10/24  Yes Natividad Nava MD   losartan (Cozaar) 100 mg tablet Take 1 tablet (100 mg) by mouth once daily. 8/5/24  Yes Lachelle Valdez MD   nadolol (Corgard) 40 mg  tablet TAKE 1 TABLET(40 MG) BY MOUTH DAILY 8/26/24  Yes Natividad Nava MD   omega-3 fatty acids-fish oil (One-Per-Day Omega-3) 684-1,200 mg capsule Take by mouth.  Omega 3 1200 MG Oral Capsule 10/21/21  Yes Historical Provider, MD   Synthroid 200 mcg tablet Take 1 tablet (200 mcg) by mouth early in the morning.. Take on an empty stomach at the same time each day, either 30 to 60 minutes prior to breakfast 5/21/24 5/21/25 Yes Natividad Nava MD   zinc gluconate 50 mg tablet Take 1 tablet (50 mg of elemental zinc) by mouth once daily.   Yes Historical Provider, MD   zinc sulfate (Zincate) 220 (50 Zn) MG capsule Take 1 capsule (50 mg of elemental zinc) by mouth.  Zinc 220 (50 Zn) MG Oral Capsule 1/27/22  Yes Historical Provider, MD   metFORMIN  mg 24 hr tablet TAKE 1 TABLET(500 MG) BY MOUTH TWICE DAILY WITH MEALS 8/26/24 8/30/24 Yes Natividad Nava MD   atorvastatin (Lipitor) 40 mg tablet TAKE 1 TABLET(40 MG) BY MOUTH DAILY  Patient not taking: Reported on 8/30/2024 8/26/24   Natividad Nava MD   cyanocobalamin (Vitamin B-12) 1,000 mcg tablet Take 1 tablet (1,000 mcg) by mouth once daily.    Historical Provider, MD   gabapentin (Neurontin) 400 mg capsule Take 1 capsule (400 mg) by mouth 3 times a day.    Historical Provider, MD   linaGLIPtin (Tradjenta) 5 mg tablet Take 1 tablet (5 mg) by mouth once daily.    Historical Provider, MD   metFORMIN  mg 24 hr tablet TAKE 1 TABLET(500 MG) BY MOUTH TWICE DAILY WITH MEALS 8/30/24   Natividad Nava MD   vit C/E/Zn/coppr/lutein/zeaxan (PRESERVISION AREDS-2 ORAL) Take by mouth.    Historical Provider, MD   atorvastatin (Lipitor) 40 mg tablet Take 1 tablet (40 mg) by mouth once daily. 2/23/24 8/26/24  Natividad Nava MD   metFORMIN  mg 24 hr tablet Take 1 tablet (500 mg) by mouth 2 times a day with meals. 2/23/24 8/26/24  Natividad Nava MD   nadolol (Corgard) 40 mg tablet Take 1 tablet (40 mg) by mouth once daily. 2/23/24 8/26/24   Natividad Nava MD     Allergies   Allergen Reactions    Latex, Natural Rubber Other and Unknown     sensitivity    Adhesive Tape-Silicones Rash    Ampicillin Hives and Rash    Cefazolin Hives, Rash and Unknown     Social History     Tobacco Use    Smoking status: Former     Types: Cigarettes    Smokeless tobacco: Never    Tobacco comments:     Quit 2013, over 1 ppd for 40 years   Substance Use Topics    Alcohol use: Not Currently         Chemistry    Lab Results   Component Value Date/Time     07/31/2024 1009    K 4.6 07/31/2024 1009     07/31/2024 1009    CO2 28 07/31/2024 1009    BUN 16 07/31/2024 1009    CREATININE 1.40 (H) 07/31/2024 1009    Lab Results   Component Value Date/Time    CALCIUM 10.0 07/31/2024 1009    ALKPHOS 140 (H) 07/31/2024 1009    AST 22 07/31/2024 1009    ALT 24 07/31/2024 1009    BILITOT 0.6 07/31/2024 1009          Lab Results   Component Value Date/Time    WBC 4.2 (L) 07/31/2024 1009    HGB 11.2 (L) 07/31/2024 1009    HCT 36.6 07/31/2024 1009    PLT 68 (L) 07/31/2024 1009     Lab Results   Component Value Date/Time    PROTIME 11.2 07/31/2024 1009    INR 1.0 07/31/2024 1009     No results found for this or any previous visit (from the past 4464 hour(s)).  No results found for this or any previous visit from the past 1095 days.     Anesthesia Plan    History of general anesthesia?: yes  History of complications of general anesthesia?: no    ASA 3     MAC     intravenous induction   Anesthetic plan and risks discussed with patient.  Use of blood products discussed with patient who consented to blood products.    Plan discussed with CAA and CRNA.

## 2024-08-30 NOTE — ANESTHESIA POSTPROCEDURE EVALUATION
Patient: Kisha Vincent    Procedure Summary       Date: 08/30/24 Room / Location: [facility]    Anesthesia Start: 1401 Anesthesia Stop: 1441    Procedure: EGD Diagnosis:       Metabolic dysfunction-associated steatotic liver disease (MASLD)      Other cirrhosis of liver (Multi)      Esophageal varices in cirrhosis (Multi)    Scheduled Providers: Perry Foster MD Responsible Provider: Michelle Manriquez MD    Anesthesia Type: MAC ASA Status: 3            Anesthesia Type: MAC    Vitals Value Taken Time   BP 99/59 08/30/24 1441   Temp 36 08/30/24 1441   Pulse 75 08/30/24 1441   Resp 12 08/30/24 1441   SpO2 95 08/30/24 1441       Anesthesia Post Evaluation    Patient location during evaluation: bedside  Patient participation: complete - patient participated  Level of consciousness: awake and awake and alert  Pain score: 0  Pain management: adequate  Airway patency: patent  Cardiovascular status: acceptable  Respiratory status: acceptable  Hydration status: acceptable  Postoperative Nausea and Vomiting: none        No notable events documented.

## 2024-08-30 NOTE — TELEPHONE ENCOUNTER
Hepatology Nurse Coordinator Note  Called patient to review. No answer. Left a voicemail message requesting a call back. Also called her husbands number. No answer. Left a voicemail for him as well requesting a call back.     ----- Message from Perry Foster sent at 8/30/2024  4:34 PM EDT -----  Regarding: Coreg  Can you please advise patient on how to take Coreg 6.25 mg for 1 week and then bid IF WELL TOLERATED.    I WOULD LIKE HER TO STOP NADOLOL ON SUNDAY AND START COREG MONDAY

## 2024-09-03 ENCOUNTER — HOSPITAL ENCOUNTER (OUTPATIENT)
Dept: RADIOLOGY | Facility: HOSPITAL | Age: 69
Discharge: HOME | End: 2024-09-03
Payer: MEDICARE

## 2024-09-03 DIAGNOSIS — N20.0 CALCULUS OF KIDNEY: ICD-10-CM

## 2024-09-03 PROCEDURE — 74018 RADEX ABDOMEN 1 VIEW: CPT

## 2024-09-03 PROCEDURE — 74018 RADEX ABDOMEN 1 VIEW: CPT | Performed by: RADIOLOGY

## 2024-09-03 NOTE — TELEPHONE ENCOUNTER
Hepatology Nurse Coordinator Note  Spoke with patient states she picked up her Carvedilol prescription. She states her last dose of nadolol was last night and it was her last pill. She's aware nadolol should be discontinued and per Dr. Foster, should start the Carvedilol tomorrow. Patient verbalized understanding and had no further questions at this time.

## 2024-09-03 NOTE — TELEPHONE ENCOUNTER
Hepatology Nurse Coordinator Note  Spoke with patient. She states she has not started the Carvedilol and that there was a problem with the medication. She states she is going to the pharmacy now to check on it and will call back with an update. She verbalized understanding that instructions for stopping the nadolol and starting the Carvedilol would be provided once she confirms she is able to get the Carvedilol. She's aware these medication can NOT be taken together. Patient verbalized understanding.

## 2024-09-04 NOTE — ADDENDUM NOTE
Encounter addended by: Faith Frazier RN on: 9/4/2024 7:52 AM   Actions taken: Contacts section saved, Flowsheet accepted

## 2024-09-26 ENCOUNTER — APPOINTMENT (OUTPATIENT)
Dept: GASTROENTEROLOGY | Facility: CLINIC | Age: 69
End: 2024-09-26
Payer: MEDICARE

## 2024-09-26 DIAGNOSIS — R06.09 DYSPNEA ON MINIMAL EXERTION: ICD-10-CM

## 2024-09-26 DIAGNOSIS — Z76.0 MEDICATION REFILL: ICD-10-CM

## 2024-09-26 RX ORDER — ALBUTEROL SULFATE 90 UG/1
1 INHALANT RESPIRATORY (INHALATION) EVERY 4 HOURS PRN
Qty: 18 G | Refills: 1 | Status: SHIPPED | OUTPATIENT
Start: 2024-09-26

## 2024-10-07 ENCOUNTER — APPOINTMENT (OUTPATIENT)
Dept: OPHTHALMOLOGY | Facility: CLINIC | Age: 69
End: 2024-10-07
Payer: MEDICARE

## 2024-10-10 ENCOUNTER — APPOINTMENT (OUTPATIENT)
Dept: OPHTHALMOLOGY | Facility: CLINIC | Age: 69
End: 2024-10-10
Payer: MEDICARE

## 2024-10-25 ENCOUNTER — LAB (OUTPATIENT)
Dept: LAB | Facility: LAB | Age: 69
End: 2024-10-25
Payer: MEDICARE

## 2024-10-25 DIAGNOSIS — N18.30 CHRONIC KIDNEY DISEASE, STAGE 3 UNSPECIFIED (MULTI): Primary | ICD-10-CM

## 2024-10-25 LAB
25(OH)D3 SERPL-MCNC: 44 NG/ML (ref 30–100)
ALBUMIN SERPL BCP-MCNC: 4.4 G/DL (ref 3.4–5)
ANION GAP SERPL CALC-SCNC: 12 MMOL/L (ref 10–20)
BUN SERPL-MCNC: 17 MG/DL (ref 6–23)
CALCIUM SERPL-MCNC: 10.3 MG/DL (ref 8.6–10.6)
CHLORIDE SERPL-SCNC: 107 MMOL/L (ref 98–107)
CO2 SERPL-SCNC: 28 MMOL/L (ref 21–32)
CREAT SERPL-MCNC: 1.2 MG/DL (ref 0.5–1.05)
EGFRCR SERPLBLD CKD-EPI 2021: 49 ML/MIN/1.73M*2
ERYTHROCYTE [DISTWIDTH] IN BLOOD BY AUTOMATED COUNT: 12.8 % (ref 11.5–14.5)
GLUCOSE SERPL-MCNC: 135 MG/DL (ref 74–99)
HCT VFR BLD AUTO: 36.3 % (ref 36–46)
HGB BLD-MCNC: 10.8 G/DL (ref 12–16)
MCH RBC QN AUTO: 27.2 PG (ref 26–34)
MCHC RBC AUTO-ENTMCNC: 29.8 G/DL (ref 32–36)
MCV RBC AUTO: 91 FL (ref 80–100)
NRBC BLD-RTO: 0 /100 WBCS (ref 0–0)
PHOSPHATE SERPL-MCNC: 3.3 MG/DL (ref 2.5–4.9)
PLATELET # BLD AUTO: 69 X10*3/UL (ref 150–450)
POTASSIUM SERPL-SCNC: 4.5 MMOL/L (ref 3.5–5.3)
PTH-INTACT SERPL-MCNC: 71 PG/ML (ref 18.5–88)
RBC # BLD AUTO: 3.97 X10*6/UL (ref 4–5.2)
SODIUM SERPL-SCNC: 142 MMOL/L (ref 136–145)
WBC # BLD AUTO: 3 X10*3/UL (ref 4.4–11.3)

## 2024-10-25 PROCEDURE — 85027 COMPLETE CBC AUTOMATED: CPT

## 2024-10-25 PROCEDURE — 36415 COLL VENOUS BLD VENIPUNCTURE: CPT

## 2024-10-25 PROCEDURE — 80069 RENAL FUNCTION PANEL: CPT

## 2024-10-25 PROCEDURE — 82306 VITAMIN D 25 HYDROXY: CPT

## 2024-10-25 PROCEDURE — 83970 ASSAY OF PARATHORMONE: CPT

## 2024-11-08 ENCOUNTER — APPOINTMENT (OUTPATIENT)
Dept: OPHTHALMOLOGY | Facility: CLINIC | Age: 69
End: 2024-11-08
Payer: MEDICARE

## 2024-11-10 DIAGNOSIS — E03.9 HYPOTHYROIDISM, UNSPECIFIED TYPE: ICD-10-CM

## 2024-11-11 RX ORDER — LEVOTHYROXINE SODIUM 200 UG/1
TABLET ORAL
Qty: 90 TABLET | Refills: 0 | Status: SHIPPED | OUTPATIENT
Start: 2024-11-11

## 2024-11-19 DIAGNOSIS — I10 PRIMARY HYPERTENSION: ICD-10-CM

## 2024-11-19 RX ORDER — LOSARTAN POTASSIUM 100 MG/1
100 TABLET ORAL DAILY
Qty: 90 TABLET | Refills: 0 | Status: SHIPPED | OUTPATIENT
Start: 2024-11-19

## 2025-01-18 ENCOUNTER — LAB (OUTPATIENT)
Dept: LAB | Facility: LAB | Age: 70
End: 2025-01-18
Payer: MEDICARE

## 2025-01-20 ENCOUNTER — LAB (OUTPATIENT)
Dept: LAB | Facility: LAB | Age: 70
End: 2025-01-20
Payer: MEDICARE

## 2025-01-20 DIAGNOSIS — D61.818 PANCYTOPENIA: ICD-10-CM

## 2025-01-20 LAB
ALBUMIN SERPL BCP-MCNC: 4.4 G/DL (ref 3.4–5)
ALP SERPL-CCNC: 141 U/L (ref 33–136)
ALT SERPL W P-5'-P-CCNC: 24 U/L (ref 7–45)
ANION GAP SERPL CALCULATED.3IONS-SCNC: 10 MMOL/L (ref 10–20)
AST SERPL W P-5'-P-CCNC: 22 U/L (ref 9–39)
BASOPHILS # BLD MANUAL: 0.06 X10*3/UL (ref 0–0.1)
BASOPHILS NFR BLD MANUAL: 2 %
BILIRUB SERPL-MCNC: 0.7 MG/DL (ref 0–1.2)
BUN SERPL-MCNC: 19 MG/DL (ref 6–23)
CALCIUM SERPL-MCNC: 9.6 MG/DL (ref 8.6–10.3)
CHLORIDE SERPL-SCNC: 104 MMOL/L (ref 98–107)
CO2 SERPL-SCNC: 30 MMOL/L (ref 21–32)
CREAT SERPL-MCNC: 1.42 MG/DL (ref 0.5–1.05)
EGFRCR SERPLBLD CKD-EPI 2021: 40 ML/MIN/1.73M*2
EOSINOPHIL # BLD MANUAL: 0.06 X10*3/UL (ref 0–0.7)
EOSINOPHIL NFR BLD MANUAL: 2 %
ERYTHROCYTE [DISTWIDTH] IN BLOOD BY AUTOMATED COUNT: 13.7 % (ref 11.5–14.5)
GLUCOSE SERPL-MCNC: 174 MG/DL (ref 74–99)
HCT VFR BLD AUTO: 37.7 % (ref 36–46)
HGB BLD-MCNC: 11.4 G/DL (ref 12–16)
IMM GRANULOCYTES # BLD AUTO: 0 X10*3/UL (ref 0–0.7)
IMM GRANULOCYTES NFR BLD AUTO: 0 % (ref 0–0.9)
LYMPHOCYTES # BLD MANUAL: 0.5 X10*3/UL (ref 1.2–4.8)
LYMPHOCYTES NFR BLD MANUAL: 16 %
MCH RBC QN AUTO: 27.4 PG (ref 26–34)
MCHC RBC AUTO-ENTMCNC: 30.2 G/DL (ref 32–36)
MCV RBC AUTO: 91 FL (ref 80–100)
MONOCYTES # BLD MANUAL: 0.12 X10*3/UL (ref 0.1–1)
MONOCYTES NFR BLD MANUAL: 4 %
NEUTS SEG # BLD MANUAL: 2.36 X10*3/UL (ref 1.2–7)
NEUTS SEG NFR BLD MANUAL: 76 %
NRBC BLD-RTO: 0 /100 WBCS (ref 0–0)
PLATELET # BLD AUTO: 66 X10*3/UL (ref 150–450)
POTASSIUM SERPL-SCNC: 4.8 MMOL/L (ref 3.5–5.3)
PROT SERPL-MCNC: 6.9 G/DL (ref 6.4–8.2)
RBC # BLD AUTO: 4.16 X10*6/UL (ref 4–5.2)
RBC MORPH BLD: ABNORMAL
SODIUM SERPL-SCNC: 139 MMOL/L (ref 136–145)
TOTAL CELLS COUNTED BLD: 100
WBC # BLD AUTO: 3.1 X10*3/UL (ref 4.4–11.3)

## 2025-01-20 PROCEDURE — 80053 COMPREHEN METABOLIC PANEL: CPT

## 2025-01-20 PROCEDURE — 85027 COMPLETE CBC AUTOMATED: CPT

## 2025-01-20 PROCEDURE — 85007 BL SMEAR W/DIFF WBC COUNT: CPT

## 2025-01-30 ENCOUNTER — OFFICE VISIT (OUTPATIENT)
Dept: HEMATOLOGY/ONCOLOGY | Facility: HOSPITAL | Age: 70
End: 2025-01-30
Payer: MEDICARE

## 2025-01-30 VITALS
HEIGHT: 59 IN | TEMPERATURE: 97.5 F | SYSTOLIC BLOOD PRESSURE: 135 MMHG | OXYGEN SATURATION: 97 % | DIASTOLIC BLOOD PRESSURE: 85 MMHG | BODY MASS INDEX: 30.8 KG/M2 | RESPIRATION RATE: 16 BRPM | WEIGHT: 152.78 LBS | HEART RATE: 78 BPM

## 2025-01-30 DIAGNOSIS — D61.818 PANCYTOPENIA: Primary | ICD-10-CM

## 2025-01-30 DIAGNOSIS — D50.9 IRON DEFICIENCY ANEMIA, UNSPECIFIED IRON DEFICIENCY ANEMIA TYPE: ICD-10-CM

## 2025-01-30 DIAGNOSIS — K74.60 CIRRHOSIS OF LIVER WITHOUT ASCITES, UNSPECIFIED HEPATIC CIRRHOSIS TYPE (MULTI): ICD-10-CM

## 2025-01-30 PROCEDURE — 1159F MED LIST DOCD IN RCRD: CPT | Performed by: INTERNAL MEDICINE

## 2025-01-30 PROCEDURE — 80053 COMPREHEN METABOLIC PANEL: CPT

## 2025-01-30 PROCEDURE — 99215 OFFICE O/P EST HI 40 MIN: CPT | Performed by: INTERNAL MEDICINE

## 2025-01-30 PROCEDURE — 3008F BODY MASS INDEX DOCD: CPT | Performed by: INTERNAL MEDICINE

## 2025-01-30 PROCEDURE — 1126F AMNT PAIN NOTED NONE PRSNT: CPT | Performed by: INTERNAL MEDICINE

## 2025-01-30 PROCEDURE — 4010F ACE/ARB THERAPY RXD/TAKEN: CPT | Performed by: INTERNAL MEDICINE

## 2025-01-30 PROCEDURE — 3079F DIAST BP 80-89 MM HG: CPT | Performed by: INTERNAL MEDICINE

## 2025-01-30 PROCEDURE — 3075F SYST BP GE 130 - 139MM HG: CPT | Performed by: INTERNAL MEDICINE

## 2025-01-30 ASSESSMENT — COLUMBIA-SUICIDE SEVERITY RATING SCALE - C-SSRS
2. HAVE YOU ACTUALLY HAD ANY THOUGHTS OF KILLING YOURSELF?: NO
1. IN THE PAST MONTH, HAVE YOU WISHED YOU WERE DEAD OR WISHED YOU COULD GO TO SLEEP AND NOT WAKE UP?: NO
6. HAVE YOU EVER DONE ANYTHING, STARTED TO DO ANYTHING, OR PREPARED TO DO ANYTHING TO END YOUR LIFE?: NO

## 2025-01-30 ASSESSMENT — PATIENT HEALTH QUESTIONNAIRE - PHQ9
1. LITTLE INTEREST OR PLEASURE IN DOING THINGS: NOT AT ALL
SUM OF ALL RESPONSES TO PHQ9 QUESTIONS 1 AND 2: 0
2. FEELING DOWN, DEPRESSED OR HOPELESS: NOT AT ALL

## 2025-01-30 ASSESSMENT — ENCOUNTER SYMPTOMS
FATIGUE: 1
CARDIOVASCULAR NEGATIVE: 1
GASTROINTESTINAL NEGATIVE: 1
FEVER: 0
RESPIRATORY NEGATIVE: 1

## 2025-01-30 ASSESSMENT — PAIN SCALES - GENERAL: PAINLEVEL_OUTOF10: 0-NO PAIN

## 2025-01-30 NOTE — PROGRESS NOTES
"Patient ID: Kisha Vincent is a 69 y.o. female.    Subjective:  Was initially referred for cytopenias. My initial note in Augh 2024 was as follows:  Feels OK with mild chronic fatigue. Denies hair loss. Denies seeing blood in urine or stool. Denies easy bruising. No recent infecitons. No B symptoms. Has had low Plt count for many years. Has CASEY and had esophageal varices banded before.     Feels tired. Denies having new complaints. No B symptoms. NO blood in urine or stool.    Assessment/Plan:  ? Pancytopenia: All counts are mildly decreased. But stable. She has portal hypertension as seen on CT scans and also recent EGD in Aug 2024 had shown esophageal varices + portal gastropathy. No indication for treatment.     Except, she may develop iron deficiency anemia due to portal gastropathy => Will check her iron levels today and give IV iron if needed. Will check labs in 6 mos and see her in 1 yr. Pt concurs.     Review Of Systems:  Review of Systems   Constitutional:  Positive for fatigue. Negative for fever.   HENT:  Negative.     Respiratory: Negative.     Cardiovascular: Negative.    Gastrointestinal: Negative.        Physical Exam:  /85 (BP Location: Left arm, Patient Position: Sitting, BP Cuff Size: Adult)   Pulse 78   Temp 36.4 °C (97.5 °F) (Temporal)   Resp 16   Ht 1.499 m (4' 11\")   Wt 69.3 kg (152 lb 12.5 oz)   SpO2 97%   BMI 30.86 kg/m²   BSA: 1.7 meters squared  Performance Status: Symptomatic; fully ambulatory  Physical Exam  Constitutional:       Appearance: Normal appearance.   HENT:      Head: Normocephalic and atraumatic.   Eyes:      Pupils: Pupils are equal, round, and reactive to light.   Cardiovascular:      Rate and Rhythm: Normal rate and regular rhythm.   Pulmonary:      Effort: Pulmonary effort is normal.   Abdominal:      General: There is distension.      Palpations: Abdomen is soft.      Comments: Hepatomegaly+. Did not feel spleen   Musculoskeletal:      Right lower leg: No " What Type Of Note Output Would You Prefer (Optional)?: Standard Output How Severe Is Your Skin Lesion?: mild edema.      Left lower leg: No edema.   Lymphadenopathy:      Cervical: No cervical adenopathy.   Skin:     Coloration: Skin is not jaundiced.   Neurological:      General: No focal deficit present.      Mental Status: She is alert and oriented to person, place, and time.         Results:  Diagnostic Results   Lab Results   Component Value Date    WBC 3.1 (L) 01/20/2025    HGB 11.4 (L) 01/20/2025    HCT 37.7 01/20/2025    MCV 91 01/20/2025    PLT 66 (L) 01/20/2025     Lab Results   Component Value Date    CALCIUM 9.6 01/20/2025     01/20/2025    K 4.8 01/20/2025    CO2 30 01/20/2025     01/20/2025    BUN 19 01/20/2025    CREATININE 1.42 (H) 01/20/2025    ALT 24 01/20/2025    AST 22 01/20/2025       Current Outpatient Medications:     acetaminophen (Tylenol) 500 mg tablet, Take 1 tablet (500 mg) by mouth every 6 hours if needed for mild pain (1 - 3) or fever (temp greater than 38.0 C)., Disp: , Rfl:     albuterol 90 mcg/actuation inhaler, INHALE 1 PUFF BY MOUTH EVERY 4 HOURS AS NEEDED FOR WHEEZING, Disp: 18 g, Rfl: 0    atorvastatin (Lipitor) 40 mg tablet, TAKE 1 TABLET(40 MG) BY MOUTH DAILY, Disp: 90 tablet, Rfl: 1    carvedilol (Coreg) 6.25 mg tablet, Take 1 tablet (6.25 mg) by mouth 2 times a day., Disp: 60 tablet, Rfl: 11    FreeStyle Lite Strips strip, USE TO TEST BLOOD SUGAR ONCE DAILY, Disp: 100 strip, Rfl: 3    IRON, FERROUS SULFATE, ORAL, Take 1 tablet by mouth once daily., Disp: , Rfl:     losartan (Cozaar) 100 mg tablet, Take 1 tablet (100 mg) by mouth once daily., Disp: 90 tablet, Rfl: 0    metFORMIN  mg 24 hr tablet, TAKE 1 TABLET(500 MG) BY MOUTH TWICE DAILY WITH MEALS, Disp: 180 tablet, Rfl: 1    Synthroid 200 mcg tablet, TAKE 1 TABLET BY MOUTH EARLY IN THE AM TAKE ON EMPTY STOMACH AT SAME TIME EACH DAY EITHER 30 TO 60 MINS BEFORE BREAKFAST, Disp: 90 tablet, Rfl: 0    vit C/E/Zn/coppr/lutein/zeaxan (PRESERVISION AREDS-2 ORAL), Take by mouth., Disp: , Rfl:     zinc gluconate 50 mg  Has Your Skin Lesion Been Treated?: not been treated tablet, Take 1 tablet (50 mg of elemental zinc) by mouth once daily., Disp: , Rfl:     cholecalciferol (Vitamin D-3) 50 mcg (2,000 unit) capsule, Take 1 capsule (50 mcg) by mouth early in the morning.., Disp: , Rfl:     cyanocobalamin (Vitamin B-12) 1,000 mcg tablet, Take 1 tablet (1,000 mcg) by mouth once daily., Disp: , Rfl:     gabapentin (Neurontin) 400 mg capsule, Take 1 capsule (400 mg) by mouth 3 times a day., Disp: , Rfl:     linaGLIPtin (Tradjenta) 5 mg tablet, Take 1 tablet (5 mg) by mouth once daily., Disp: , Rfl:     omega-3 fatty acids-fish oil (One-Per-Day Omega-3) 684-1,200 mg capsule, Take by mouth.  Omega 3 1200 MG Oral Capsule (Patient not taking: Reported on 2025), Disp: , Rfl:     zinc sulfate (Zincate) 220 (50 Zn) MG capsule, Take 1 capsule (50 mg of elemental zinc) by mouth.  Zinc 220 (50 Zn) MG Oral Capsule, Disp: , Rfl:      Past Surgical History:   Procedure Laterality Date    CATARACT EXTRACTION  02/15/2018    Cataract Surgery     SECTION, CLASSIC  02/15/2018     Section    OTHER SURGICAL HISTORY  2020    Cervical biopsy procedure colposcopic    TUBAL LIGATION  02/15/2018    Tubal Ligation    US GUIDED PERCUTANEOUS PLACEMENT  8/10/2021    US GUIDED PERCUTANEOUS PLACEMENT [facility]     Family History   Problem Relation Name Age of Onset    Diabetes Mother      Breast cancer Mother  80    Hypertension Father      Lung cancer Father      Thyroid disease Maternal Grandmother      Diabetes Paternal Grandmother      Breast cancer Paternal Grandmother  60      reports that she has quit smoking. Her smoking use included cigarettes. She has never used smokeless tobacco.  Social History     Socioeconomic History    Marital status:      Spouse name: Not on file    Number of children: Not on file    Years of education: Not on file    Highest education level: Not on file   Occupational History    Not on file   Tobacco Use    Smoking status: Former     Types:  Cigarettes    Smokeless tobacco: Never    Tobacco comments:     Quit 2013, over 1 ppd for 40 years   Substance and Sexual Activity    Alcohol use: Not Currently    Drug use: Never    Sexual activity: Not on file   Other Topics Concern    Not on file   Social History Narrative    Not on file     Social Drivers of Health     Financial Resource Strain: Not on file   Food Insecurity: No Food Insecurity (8/21/2024)    Hunger Vital Sign     Worried About Running Out of Food in the Last Year: Never true     Ran Out of Food in the Last Year: Never true   Transportation Needs: Not on file   Physical Activity: Not on file   Stress: Not on file   Social Connections: Not on file   Intimate Partner Violence: Not on file   Housing Stability: Not on file       Diagnoses and all orders for this visit:  Pancytopenia  -     Clinic Appointment Request  -     CBC and Auto Differential; Future  -     Ferritin; Future  -     Iron and TIBC; Future  -     CBC and Auto Differential; Future  -     Comprehensive Metabolic Panel; Future  -     Ferritin; Future  -     Iron and TIBC; Future  -     Alpha-Fetoprotein; Future  -     Clinic Appointment Request; Future  -     CBC and Auto Differential; Future  -     Comprehensive Metabolic Panel; Future  -     Ferritin; Future  -     Iron and TIBC; Future  Iron deficiency anemia, unspecified iron deficiency anemia type  -     CBC and Auto Differential; Future  -     Ferritin; Future  -     Iron and TIBC; Future  -     CBC and Auto Differential; Future  -     Comprehensive Metabolic Panel; Future  -     Ferritin; Future  -     Iron and TIBC; Future  -     Alpha-Fetoprotein; Future  -     Clinic Appointment Request; Future  -     CBC and Auto Differential; Future  -     Comprehensive Metabolic Panel; Future  -     Ferritin; Future  -     Iron and TIBC; Future  Cirrhosis of liver without ascites, unspecified hepatic cirrhosis type (Multi)  -     CBC and Auto Differential; Future  -     Ferritin;  Is This A New Presentation, Or A Follow-Up?: Skin Lesion Future  -     Iron and TIBC; Future  -     CBC and Auto Differential; Future  -     Comprehensive Metabolic Panel; Future  -     Ferritin; Future  -     Iron and TIBC; Future  -     Alpha-Fetoprotein; Future  -     Clinic Appointment Request; Future  -     CBC and Auto Differential; Future  -     Comprehensive Metabolic Panel; Future  -     Ferritin; Future  -     Iron and TIBC; Future       I spent more than 45 minutes for the patient today, including face-to-face conversation, pre-visit preparation, post-visit orders, and others.   Abdullahi Espinosa MD

## 2025-01-31 ENCOUNTER — LAB (OUTPATIENT)
Dept: LAB | Facility: HOSPITAL | Age: 70
End: 2025-01-31
Payer: MEDICARE

## 2025-01-31 DIAGNOSIS — K74.69 OTHER CIRRHOSIS OF LIVER: ICD-10-CM

## 2025-01-31 DIAGNOSIS — K74.60 CIRRHOSIS OF LIVER WITHOUT ASCITES, UNSPECIFIED HEPATIC CIRRHOSIS TYPE (MULTI): ICD-10-CM

## 2025-01-31 DIAGNOSIS — I85.10 ESOPHAGEAL VARICES IN CIRRHOSIS (MULTI): ICD-10-CM

## 2025-01-31 DIAGNOSIS — D50.9 IRON DEFICIENCY ANEMIA, UNSPECIFIED IRON DEFICIENCY ANEMIA TYPE: ICD-10-CM

## 2025-01-31 DIAGNOSIS — K76.0 METABOLIC DYSFUNCTION-ASSOCIATED STEATOTIC LIVER DISEASE (MASLD): ICD-10-CM

## 2025-01-31 DIAGNOSIS — K74.60 ESOPHAGEAL VARICES IN CIRRHOSIS (MULTI): ICD-10-CM

## 2025-01-31 DIAGNOSIS — D61.818 PANCYTOPENIA: ICD-10-CM

## 2025-01-31 LAB
ALBUMIN SERPL BCP-MCNC: 4.5 G/DL (ref 3.4–5)
ALP SERPL-CCNC: 133 U/L (ref 33–136)
ALT SERPL W P-5'-P-CCNC: 25 U/L (ref 7–45)
ANION GAP SERPL CALC-SCNC: 10 MMOL/L (ref 10–20)
AST SERPL W P-5'-P-CCNC: 22 U/L (ref 9–39)
BASOPHILS # BLD AUTO: 0.02 X10*3/UL (ref 0–0.1)
BASOPHILS NFR BLD AUTO: 0.7 %
BILIRUB SERPL-MCNC: 0.5 MG/DL (ref 0–1.2)
BUN SERPL-MCNC: 18 MG/DL (ref 6–23)
CALCIUM SERPL-MCNC: 10.1 MG/DL (ref 8.6–10.3)
CHLORIDE SERPL-SCNC: 109 MMOL/L (ref 98–107)
CO2 SERPL-SCNC: 28 MMOL/L (ref 21–32)
CREAT SERPL-MCNC: 1.27 MG/DL (ref 0.5–1.05)
EGFRCR SERPLBLD CKD-EPI 2021: 46 ML/MIN/1.73M*2
EOSINOPHIL # BLD AUTO: 0.15 X10*3/UL (ref 0–0.7)
EOSINOPHIL NFR BLD AUTO: 5 %
ERYTHROCYTE [DISTWIDTH] IN BLOOD BY AUTOMATED COUNT: 13.9 % (ref 11.5–14.5)
GLUCOSE SERPL-MCNC: 125 MG/DL (ref 74–99)
HCT VFR BLD AUTO: 37.3 % (ref 36–46)
HGB BLD-MCNC: 11 G/DL (ref 12–16)
IMM GRANULOCYTES # BLD AUTO: 0 X10*3/UL (ref 0–0.7)
IMM GRANULOCYTES NFR BLD AUTO: 0 % (ref 0–0.9)
LYMPHOCYTES # BLD AUTO: 0.51 X10*3/UL (ref 1.2–4.8)
LYMPHOCYTES NFR BLD AUTO: 17 %
MCH RBC QN AUTO: 27.2 PG (ref 26–34)
MCHC RBC AUTO-ENTMCNC: 29.5 G/DL (ref 32–36)
MCV RBC AUTO: 92 FL (ref 80–100)
MONOCYTES # BLD AUTO: 0.32 X10*3/UL (ref 0.1–1)
MONOCYTES NFR BLD AUTO: 10.7 %
NEUTROPHILS # BLD AUTO: 2 X10*3/UL (ref 1.2–7.7)
NEUTROPHILS NFR BLD AUTO: 66.6 %
NRBC BLD-RTO: 0 /100 WBCS (ref 0–0)
PLATELET # BLD AUTO: 53 X10*3/UL (ref 150–450)
POTASSIUM SERPL-SCNC: 5.2 MMOL/L (ref 3.5–5.3)
PROT SERPL-MCNC: 6.9 G/DL (ref 6.4–8.2)
RBC # BLD AUTO: 4.04 X10*6/UL (ref 4–5.2)
SODIUM SERPL-SCNC: 142 MMOL/L (ref 136–145)
WBC # BLD AUTO: 3 X10*3/UL (ref 4.4–11.3)

## 2025-01-31 PROCEDURE — 85025 COMPLETE CBC W/AUTO DIFF WBC: CPT

## 2025-02-03 DIAGNOSIS — K76.0 METABOLIC DYSFUNCTION-ASSOCIATED STEATOTIC LIVER DISEASE (MASLD): ICD-10-CM

## 2025-02-03 DIAGNOSIS — K74.69 OTHER CIRRHOSIS OF LIVER: ICD-10-CM

## 2025-02-06 ENCOUNTER — OFFICE VISIT (OUTPATIENT)
Dept: OPHTHALMOLOGY | Facility: CLINIC | Age: 70
End: 2025-02-06
Payer: MEDICARE

## 2025-02-06 DIAGNOSIS — H02.831 DERMATOCHALASIS OF BOTH UPPER EYELIDS: ICD-10-CM

## 2025-02-06 DIAGNOSIS — H02.834 DERMATOCHALASIS OF BOTH UPPER EYELIDS: ICD-10-CM

## 2025-02-06 DIAGNOSIS — H35.3131 EARLY DRY STAGE NONEXUDATIVE AGE-RELATED MACULAR DEGENERATION OF BOTH EYES: ICD-10-CM

## 2025-02-06 DIAGNOSIS — Z96.1 PSEUDOPHAKIA: ICD-10-CM

## 2025-02-06 DIAGNOSIS — H35.3132 INTERMEDIATE STAGE NONEXUDATIVE AGE-RELATED MACULAR DEGENERATION OF BOTH EYES: Primary | ICD-10-CM

## 2025-02-06 DIAGNOSIS — E11.9 TYPE 2 DIABETES MELLITUS WITHOUT COMPLICATION, WITHOUT LONG-TERM CURRENT USE OF INSULIN (MULTI): ICD-10-CM

## 2025-02-06 DIAGNOSIS — H52.7 REFRACTIVE ERROR: ICD-10-CM

## 2025-02-06 DIAGNOSIS — G43.109 OCULAR MIGRAINE: ICD-10-CM

## 2025-02-06 PROBLEM — E11.42 DIABETIC POLYNEUROPATHY ASSOCIATED WITH TYPE 2 DIABETES MELLITUS (MULTI): Status: RESOLVED | Noted: 2023-09-21 | Resolved: 2025-02-06

## 2025-02-06 PROBLEM — D69.6 THROMBOCYTOPENIA (CMS-HCC): Status: RESOLVED | Noted: 2023-09-21 | Resolved: 2025-02-06

## 2025-02-06 PROBLEM — E11.40 TYPE 2 DIABETES MELLITUS WITH DIABETIC NEUROPATHY, UNSPECIFIED (MULTI): Status: RESOLVED | Noted: 2023-09-21 | Resolved: 2025-02-06

## 2025-02-06 PROCEDURE — 92015 DETERMINE REFRACTIVE STATE: CPT | Performed by: OPHTHALMOLOGY

## 2025-02-06 PROCEDURE — 99214 OFFICE O/P EST MOD 30 MIN: CPT | Performed by: OPHTHALMOLOGY

## 2025-02-06 PROCEDURE — 92134 CPTRZ OPH DX IMG PST SGM RTA: CPT | Performed by: OPHTHALMOLOGY

## 2025-02-06 ASSESSMENT — VISUAL ACUITY
OS_PH_SC: 20/40
OD_PH_SC: 20/40
OS_SC: 20/60
OS_SC+: -1
OD_SC+: -2
OD_SC: 20/60
METHOD: SNELLEN - LINEAR

## 2025-02-06 ASSESSMENT — REFRACTION_MANIFEST
OS_CYLINDER: -0.50
OS_SPHERE: -0.50
OD_SPHERE: +0.75
OD_CYLINDER: -1.25
OS_CYLINDER: -0.50
OS_ADD: +2.25
OS_SPHERE: -1.00
METHOD_AUTOREFRACTION: 1
OD_ADD: +2.25
OD_AXIS: 075
OD_CYLINDER: -1.25
OS_AXIS: 025
OD_AXIS: 075
OS_AXIS: 030
OD_SPHERE: +0.25

## 2025-02-06 ASSESSMENT — ENCOUNTER SYMPTOMS
HEMATOLOGIC/LYMPHATIC NEGATIVE: 0
EYES NEGATIVE: 0
ALLERGIC/IMMUNOLOGIC NEGATIVE: 0
ENDOCRINE NEGATIVE: 0
PSYCHIATRIC NEGATIVE: 0
MUSCULOSKELETAL NEGATIVE: 0
CARDIOVASCULAR NEGATIVE: 0
GASTROINTESTINAL NEGATIVE: 0
NEUROLOGICAL NEGATIVE: 0
RESPIRATORY NEGATIVE: 0
CONSTITUTIONAL NEGATIVE: 0

## 2025-02-06 ASSESSMENT — KERATOMETRY
OS_K1POWER_DIOPTERS: 43.00
OD_AXISANGLE2_DEGREES: 45
OD_AXISANGLE_DEGREES: 135
OS_AXISANGLE_DEGREES: 100
OS_AXISANGLE2_DEGREES: 10
OD_K1POWER_DIOPTERS: 42.50
OD_K2POWER_DIOPTERS: 43.25
OS_K2POWER_DIOPTERS: 44.00

## 2025-02-06 ASSESSMENT — TONOMETRY
IOP_METHOD: GOLDMANN APPLANATION
OS_IOP_MMHG: 16
OD_IOP_MMHG: 14

## 2025-02-06 ASSESSMENT — CUP TO DISC RATIO
OS_RATIO: 0.35
OD_RATIO: 0.35

## 2025-02-06 ASSESSMENT — SLIT LAMP EXAM - LIDS
COMMENTS: 1+ DERMATOCHALASIS - UPPER LID, 1+ BLEPHARITIS
COMMENTS: 1+ DERMATOCHALASIS - UPPER LID, 1+ BLEPHARITIS

## 2025-02-06 ASSESSMENT — EXTERNAL EXAM - RIGHT EYE: OD_EXAM: NORMAL

## 2025-02-06 ASSESSMENT — EXTERNAL EXAM - LEFT EYE: OS_EXAM: NORMAL

## 2025-02-06 ASSESSMENT — PAIN SCALES - GENERAL: PAINLEVEL_OUTOF10: 0-NO PAIN

## 2025-02-06 NOTE — LETTER
February 6, 2025    Natividad Nava MD  [address] [facility]  [facility] [address]     Patient: Kisha Vincent   YOB: 1955   Date of Visit: 2/6/2025       Dear Dr. Natividad Nava MD:    Thank you for referring Kisha Vincent to me for evaluation. Here is my assessment and plan of care:    Assessment/Plan:  Kisha was seen today for annual exam, spots and/or floaters and flashes, light.  Diagnoses and all orders for this visit:  Intermediate stage nonexudative age-related macular degeneration of both eyes (Primary)  -     OCT, Retina - OU - Both Eyes  Early dry stage nonexudative age-related macular degeneration of both eyes  Pseudophakia  Ocular migraine  Type 2 diabetes mellitus without complication, without long-term current use of insulin (Multi)  Dermatochalasis of both upper eyelids  Refractive error    Below you will find my full exam findings. If you have questions, please do not hesitate to call me. I look forward to following Kisha along with you.     No signs of background diabetic retinopathy (BDR) OU.  F/u one year full.      Sincerely,        Gualberto Gar MD        CC:   No Recipients        Base Eye Exam       Visual Acuity (Snellen - Linear)         Right Left Both    Dist sc 20/60 -2 20/60 -1     Dist ph sc 20/40 20/40     Near sc   J5              Tonometry (Goldmann Applanation, 3:31 PM)         Right Left    Pressure 14 16              Pupils         Dark Shape React APD    Right 4 Round 1 None    Left 4 Round 1 None              Extraocular Movement         Right Left     Full Full              Dilation       Both eyes: 1% Tropic 2.5% Phen @ 3:31 PM                  Additional Tests       Keratometry         K1 Axis K2 Axis    Right 42.50 45 43.25 135    Left 43.00 10 44.00 100                  Slit Lamp and Fundus Exam       External Exam         Right Left    External Normal Normal              Slit Lamp Exam         Right Left     Lids/Lashes 1+ Dermatochalasis - upper lid, 1+ Blepharitis 1+ Dermatochalasis - upper lid, 1+ Blepharitis    Conjunctiva/Sclera White and quiet White and quiet    Cornea Clear Clear    Anterior Chamber Deep and quiet Deep and quiet    Iris Round and reactive Round and reactive    Lens Posterior chamber intraocular lens, Open posterior capsule Posterior chamber intraocular lens, Open posterior capsule    Anterior Vitreous Vitreous syneresis Vitreous syneresis              Fundus Exam         Right Left    Disc Normal Normal    C/D Ratio 0.35 0.35    Macula Intermediate age related macular degeneration; no background diabetic retinopathy (BDR) Intermediate age related macular degeneration; no background diabetic retinopathy (BDR)    Vessels Normal Normal    Periphery Normal Normal                  Refraction       Wearing Rx       Type: NONE              Manifest Refraction (Auto)         Sphere Cylinder [place] Dist VA Add Near VA    Right +0.75 -1.25 075       Left -1.00 -0.50 030                 Manifest Refraction #2 (Subjective)         Sphere Cylinder [place] Dist VA Add Near VA    Right +0.25 -1.25 075 20/30 +2.25 20/25    Left -0.50 -0.50 025 20/30 +2.25 20/25      Pupillary Distance: 66              Final Rx         Sphere Cylinder [place] Dist VA Add Near VA    Right +0.25 -1.25 075 20/30 +2.25 20/25    Left -0.50 -0.50 025 20/30 +2.25 20/25      Expiration Date: 2/6/2027    Pupillary Distance: 66

## 2025-02-06 NOTE — PROGRESS NOTES
Subjective   Patient ID: Kisha Vincent is a 69 y.o. female.    Chief Complaint    Annual Exam; Spots and/or Floaters; Flashes, Light       HPI    Complete and diabetic dilated exam.  Uses readers only.  Vision is essentially unchanged.  No recent changes in health history but some med changes.    Last edited by Gualberto Gar MD on 2/6/2025  4:08 PM.        No current outpatient medications on file. (Ophthalmology pharm classes)       Current Outpatient Medications (Other)   Medication Sig Dispense Refill    acetaminophen (Tylenol) 500 mg tablet Take 1 tablet (500 mg) by mouth every 6 hours if needed for mild pain (1 - 3) or fever (temp greater than 38.0 C).      albuterol 90 mcg/actuation inhaler INHALE 1 PUFF BY MOUTH EVERY 4 HOURS AS NEEDED FOR WHEEZING 18 g 0    atorvastatin (Lipitor) 40 mg tablet TAKE 1 TABLET(40 MG) BY MOUTH DAILY 90 tablet 1    carvedilol (Coreg) 6.25 mg tablet Take 1 tablet (6.25 mg) by mouth 2 times a day. 60 tablet 11    FreeStyle Lite Strips strip USE TO TEST BLOOD SUGAR ONCE DAILY 100 strip 3    IRON, FERROUS SULFATE, ORAL Take 1 tablet by mouth once daily.      losartan (Cozaar) 100 mg tablet Take 1 tablet (100 mg) by mouth once daily. 90 tablet 0    metFORMIN  mg 24 hr tablet TAKE 1 TABLET(500 MG) BY MOUTH TWICE DAILY WITH MEALS 180 tablet 1    omega-3 fatty acids-fish oil (One-Per-Day Omega-3) 684-1,200 mg capsule Take by mouth.  Omega 3 1200 MG Oral Capsule      Synthroid 200 mcg tablet TAKE 1 TABLET BY MOUTH EARLY IN THE AM TAKE ON EMPTY STOMACH AT SAME TIME EACH DAY EITHER 30 TO 60 MINS BEFORE BREAKFAST 90 tablet 0    vit C/E/Zn/coppr/lutein/zeaxan (PRESERVISION AREDS-2 ORAL) Take by mouth.      zinc gluconate 50 mg tablet Take 1 tablet (50 mg of elemental zinc) by mouth once daily.      cholecalciferol (Vitamin D-3) 50 mcg (2,000 unit) capsule Take 1 capsule (50 mcg) by mouth early in the morning..      cyanocobalamin (Vitamin B-12) 1,000 mcg tablet Take 1 tablet (1,000  mcg) by mouth once daily.      gabapentin (Neurontin) 400 mg capsule Take 1 capsule (400 mg) by mouth 3 times a day.      linaGLIPtin (Tradjenta) 5 mg tablet Take 1 tablet (5 mg) by mouth once daily.      zinc sulfate (Zincate) 220 (50 Zn) MG capsule Take 1 capsule (50 mg of elemental zinc) by mouth.  Zinc 220 (50 Zn) MG Oral Capsule         Objective   Base Eye Exam       Visual Acuity (Snellen - Linear)         Right Left Both    Dist sc 20/60 -2 20/60 -1     Dist ph sc 20/40 20/40     Near sc   J5              Tonometry (Goldmann Applanation, 3:31 PM)         Right Left    Pressure 14 16              Pupils         Dark Shape React APD    Right 4 Round 1 None    Left 4 Round 1 None              Extraocular Movement         Right Left     Full Full              Dilation       Both eyes: 1% Tropic 2.5% Phen @ 3:31 PM                  Additional Tests       Keratometry         K1 Axis K2 Axis    Right 42.50 45 43.25 135    Left 43.00 10 44.00 100                  Slit Lamp and Fundus Exam       External Exam         Right Left    External Normal Normal              Slit Lamp Exam         Right Left    Lids/Lashes 1+ Dermatochalasis - upper lid, 1+ Blepharitis 1+ Dermatochalasis - upper lid, 1+ Blepharitis    Conjunctiva/Sclera White and quiet White and quiet    Cornea Clear Clear    Anterior Chamber Deep and quiet Deep and quiet    Iris Round and reactive Round and reactive    Lens Posterior chamber intraocular lens, Open posterior capsule Posterior chamber intraocular lens, Open posterior capsule    Anterior Vitreous Vitreous syneresis Vitreous syneresis              Fundus Exam         Right Left    Disc Normal Normal    C/D Ratio 0.35 0.35    Macula Intermediate age related macular degeneration; no background diabetic retinopathy (BDR) Intermediate age related macular degeneration; no background diabetic retinopathy (BDR)    Vessels Normal Normal    Periphery Normal Normal                  Refraction        Wearing Rx       Type: NONE              Manifest Refraction (Auto)         Sphere Cylinder [place] Dist VA Add Near VA    Right +0.75 -1.25 075       Left -1.00 -0.50 030                 Manifest Refraction #2 (Subjective)         Sphere Cylinder [place] Dist VA Add Near VA    Right +0.25 -1.25 075 20/30 +2.25 20/25    Left -0.50 -0.50 025 20/30 +2.25 20/25      Pupillary Distance: 66              Final Rx         Sphere Cylinder [place] Dist VA Add Near VA    Right +0.25 -1.25 075 20/30 +2.25 20/25    Left -0.50 -0.50 025 20/30 +2.25 20/25      Expiration Date: 2/6/2027    Pupillary Distance: 66                    Assessment/Plan   Problem List Items Addressed This Visit          Eye/Vision problems    Pseudophakia    Dermatochalasis of both upper eyelids    Diabetes mellitus, type 2 (Multi)     F/u one year full with oct mac.  Spec rx given.           Intermediate stage nonexudative age-related macular degeneration of both eyes - Primary    Relevant Orders    OCT, Retina - OU - Both Eyes (Completed)    Refractive error       Other    Ocular migraine

## 2025-02-07 DIAGNOSIS — E03.9 HYPOTHYROIDISM, UNSPECIFIED TYPE: ICD-10-CM

## 2025-02-08 RX ORDER — LEVOTHYROXINE SODIUM 200 UG/1
TABLET ORAL
Qty: 90 TABLET | Refills: 0 | Status: SHIPPED | OUTPATIENT
Start: 2025-02-08

## 2025-02-10 DIAGNOSIS — K76.0 METABOLIC DYSFUNCTION-ASSOCIATED STEATOTIC LIVER DISEASE (MASLD): ICD-10-CM

## 2025-02-10 DIAGNOSIS — K74.69 OTHER CIRRHOSIS OF LIVER: ICD-10-CM

## 2025-02-12 LAB
AFP-TM SERPL-MCNC: 2.5 NG/ML
INR PPP: 1
PROTHROMBIN TIME: 10.3 SEC (ref 9–11.5)

## 2025-02-21 ENCOUNTER — HOSPITAL ENCOUNTER (OUTPATIENT)
Dept: RADIOLOGY | Facility: CLINIC | Age: 70
Discharge: HOME | End: 2025-02-21
Payer: MEDICARE

## 2025-02-21 DIAGNOSIS — I85.10 ESOPHAGEAL VARICES IN CIRRHOSIS (MULTI): ICD-10-CM

## 2025-02-21 DIAGNOSIS — K74.60 ESOPHAGEAL VARICES IN CIRRHOSIS (MULTI): ICD-10-CM

## 2025-02-21 DIAGNOSIS — K74.69 OTHER CIRRHOSIS OF LIVER: ICD-10-CM

## 2025-02-21 DIAGNOSIS — K76.0 METABOLIC DYSFUNCTION-ASSOCIATED STEATOTIC LIVER DISEASE (MASLD): ICD-10-CM

## 2025-02-21 PROCEDURE — 93975 VASCULAR STUDY: CPT

## 2025-02-22 DIAGNOSIS — E03.9 HYPOTHYROIDISM, UNSPECIFIED TYPE: ICD-10-CM

## 2025-02-22 DIAGNOSIS — Z76.0 MEDICATION REFILL: ICD-10-CM

## 2025-02-24 RX ORDER — ATORVASTATIN CALCIUM 40 MG/1
40 TABLET, FILM COATED ORAL DAILY
Qty: 90 TABLET | Refills: 0 | Status: SHIPPED | OUTPATIENT
Start: 2025-02-24

## 2025-02-25 ENCOUNTER — PATIENT MESSAGE (OUTPATIENT)
Dept: PRIMARY CARE | Facility: CLINIC | Age: 70
End: 2025-02-25
Payer: MEDICARE

## 2025-02-25 DIAGNOSIS — I10 PRIMARY HYPERTENSION: ICD-10-CM

## 2025-02-25 RX ORDER — LOSARTAN POTASSIUM 100 MG/1
100 TABLET ORAL DAILY
Qty: 90 TABLET | Refills: 0 | Status: SHIPPED | OUTPATIENT
Start: 2025-02-25

## 2025-03-04 ENCOUNTER — HOSPITAL ENCOUNTER (OUTPATIENT)
Dept: RADIOLOGY | Facility: HOSPITAL | Age: 70
Discharge: HOME | End: 2025-03-04
Payer: MEDICARE

## 2025-03-04 DIAGNOSIS — N20.0 CALCULUS OF KIDNEY: ICD-10-CM

## 2025-03-04 PROCEDURE — 74018 RADEX ABDOMEN 1 VIEW: CPT

## 2025-03-04 PROCEDURE — 74018 RADEX ABDOMEN 1 VIEW: CPT | Performed by: RADIOLOGY

## 2025-03-11 ENCOUNTER — OFFICE VISIT (OUTPATIENT)
Dept: PRIMARY CARE | Facility: CLINIC | Age: 70
End: 2025-03-11
Payer: MEDICARE

## 2025-03-11 VITALS
OXYGEN SATURATION: 97 % | TEMPERATURE: 98 F | WEIGHT: 147 LBS | HEART RATE: 70 BPM | SYSTOLIC BLOOD PRESSURE: 116 MMHG | DIASTOLIC BLOOD PRESSURE: 70 MMHG | BODY MASS INDEX: 29.69 KG/M2

## 2025-03-11 DIAGNOSIS — E03.9 HYPOTHYROIDISM, UNSPECIFIED TYPE: ICD-10-CM

## 2025-03-11 DIAGNOSIS — E11.9 TYPE 2 DIABETES MELLITUS WITHOUT COMPLICATION, WITHOUT LONG-TERM CURRENT USE OF INSULIN (MULTI): Primary | ICD-10-CM

## 2025-03-11 PROCEDURE — 3078F DIAST BP <80 MM HG: CPT | Performed by: STUDENT IN AN ORGANIZED HEALTH CARE EDUCATION/TRAINING PROGRAM

## 2025-03-11 PROCEDURE — G2211 COMPLEX E/M VISIT ADD ON: HCPCS | Performed by: STUDENT IN AN ORGANIZED HEALTH CARE EDUCATION/TRAINING PROGRAM

## 2025-03-11 PROCEDURE — 1125F AMNT PAIN NOTED PAIN PRSNT: CPT | Performed by: STUDENT IN AN ORGANIZED HEALTH CARE EDUCATION/TRAINING PROGRAM

## 2025-03-11 PROCEDURE — 4010F ACE/ARB THERAPY RXD/TAKEN: CPT | Performed by: STUDENT IN AN ORGANIZED HEALTH CARE EDUCATION/TRAINING PROGRAM

## 2025-03-11 PROCEDURE — 1159F MED LIST DOCD IN RCRD: CPT | Performed by: STUDENT IN AN ORGANIZED HEALTH CARE EDUCATION/TRAINING PROGRAM

## 2025-03-11 PROCEDURE — 3074F SYST BP LT 130 MM HG: CPT | Performed by: STUDENT IN AN ORGANIZED HEALTH CARE EDUCATION/TRAINING PROGRAM

## 2025-03-11 PROCEDURE — 99214 OFFICE O/P EST MOD 30 MIN: CPT | Performed by: STUDENT IN AN ORGANIZED HEALTH CARE EDUCATION/TRAINING PROGRAM

## 2025-03-11 RX ORDER — DULAGLUTIDE 0.75 MG/.5ML
0.75 INJECTION, SOLUTION SUBCUTANEOUS WEEKLY
Qty: 2 ML | Refills: 3 | Status: SHIPPED | OUTPATIENT
Start: 2025-03-11

## 2025-03-11 ASSESSMENT — PATIENT HEALTH QUESTIONNAIRE - PHQ9
SUM OF ALL RESPONSES TO PHQ9 QUESTIONS 1 AND 2: 0
1. LITTLE INTEREST OR PLEASURE IN DOING THINGS: NOT AT ALL
2. FEELING DOWN, DEPRESSED OR HOPELESS: NOT AT ALL

## 2025-03-11 ASSESSMENT — PAIN SCALES - GENERAL: PAINLEVEL_OUTOF10: 5

## 2025-03-11 NOTE — PROGRESS NOTES
Subjective   Kisha Vincent is a 69 y.o. female who presents for Diaabetic and thyroid issues needs to be discussed.    HPI:      This is a 69-year-old female presenting for follow-up regarding diabetes and thyroid.    DM2:  Glycemic Meds:    Metformin  mg BID  On Tradjenta over 2 years ago.  ACEi/ARB:  losartan 100 mg  Statin:  atorvastatin 40 mg    Lab Results   Component Value Date    HGBA1C 6.1 (H) 03/12/2025     Hypothyroidism:  Taking Synthroid 200 mcg daily  Lab Results   Component Value Date    TSH 0.02 (L) 03/12/2025         ROS:   Review of systems is essentially negative for all systems except for any identified issues in HPI above.    Objective     /70   Pulse 70   Temp 36.7 °C (98 °F)   Wt 66.7 kg (147 lb)   SpO2 97%   BMI 29.69 kg/m²      PHYSICAL EXAM    GENERAL  Well-appearing, pleasant and cooperative.  No acute distress.    HEENT  HEAD:   Normocephalic.  Atraumatic.  EYES:  PERRLA.  No scleral icterus or conjunctival injection.  NECK:  No adenopathy.  No palpable thyroid enlargement or nodules.    THROAT:  Moist oropharynx without tonsillar enlargement or exudates.    LUNGS:    Clear to auscultation bilaterally.  No wheezes, rales, rhonchi.    CARDIAC:  Regular rate and rhythm.  Normal S1S2.  No murmurs/rubs/gallops.    ABDOMEN:  Soft, non-tender, non-distended.  No hepatosplenomegaly.  Normoactive bowel sounds.    MUSCULOSKELETAL:  No gross abnormalities.       EXTREMITIES:  No LE edema or cyanosis.      NEURO           Alert and oriented x3. No focal deficits.    PSYCH:          Affect appropriate.           Assessment/Plan   Problem List Items Addressed This Visit       Diabetes mellitus, type 2 (Multi) - Primary    Relevant Medications    dulaglutide (Trulicity) 0.75 mg/0.5 mL pen injector    Other Relevant Orders    Hemoglobin A1c (Completed)    Hypothyroidism    Relevant Orders    Tsh With Reflex To Free T4 If Abnormal (Completed)       Counseling:   Medication  education:    Education: The patient is counseled regarding potential side effects of all new medications.    Understanding:  Patient expressed understanding.    Adherence:  No barriers to adherence identified.       Natividad Nava MD

## 2025-03-11 NOTE — PATIENT INSTRUCTIONS
Thank you for coming to see me today.    Go to the lab for diabetes and thyroid labs, we will notify you with all results.    New prescription for Trulicity weekly injections sent to pharmacy.    Follow-up in 3 months for MEDICARE WELLNESS VISIT, as needed for acute concerns.

## 2025-03-13 LAB
EST. AVERAGE GLUCOSE BLD GHB EST-MCNC: 128 MG/DL
EST. AVERAGE GLUCOSE BLD GHB EST-SCNC: 7.1 MMOL/L
HBA1C MFR BLD: 6.1 % OF TOTAL HGB
T4 FREE SERPL-MCNC: 1.8 NG/DL (ref 0.8–1.8)
TSH SERPL-ACNC: 0.02 MIU/L (ref 0.4–4.5)

## 2025-04-03 ENCOUNTER — OFFICE VISIT (OUTPATIENT)
Dept: GASTROENTEROLOGY | Facility: CLINIC | Age: 70
End: 2025-04-03
Payer: MEDICARE

## 2025-04-03 VITALS
HEART RATE: 92 BPM | TEMPERATURE: 98.3 F | WEIGHT: 145 LBS | SYSTOLIC BLOOD PRESSURE: 118 MMHG | DIASTOLIC BLOOD PRESSURE: 71 MMHG | RESPIRATION RATE: 20 BRPM | BODY MASS INDEX: 29.23 KG/M2 | HEIGHT: 59 IN

## 2025-04-03 DIAGNOSIS — K76.0 METABOLIC DYSFUNCTION-ASSOCIATED STEATOTIC LIVER DISEASE (MASLD): Primary | ICD-10-CM

## 2025-04-03 DIAGNOSIS — K74.60 ESOPHAGEAL VARICES IN CIRRHOSIS (MULTI): ICD-10-CM

## 2025-04-03 DIAGNOSIS — K74.69 OTHER CIRRHOSIS OF LIVER: ICD-10-CM

## 2025-04-03 DIAGNOSIS — I85.10 ESOPHAGEAL VARICES IN CIRRHOSIS (MULTI): ICD-10-CM

## 2025-04-03 PROCEDURE — 4010F ACE/ARB THERAPY RXD/TAKEN: CPT | Performed by: INTERNAL MEDICINE

## 2025-04-03 PROCEDURE — 99214 OFFICE O/P EST MOD 30 MIN: CPT | Performed by: INTERNAL MEDICINE

## 2025-04-03 PROCEDURE — 3074F SYST BP LT 130 MM HG: CPT | Performed by: INTERNAL MEDICINE

## 2025-04-03 PROCEDURE — 1159F MED LIST DOCD IN RCRD: CPT | Performed by: INTERNAL MEDICINE

## 2025-04-03 PROCEDURE — 1125F AMNT PAIN NOTED PAIN PRSNT: CPT | Performed by: INTERNAL MEDICINE

## 2025-04-03 PROCEDURE — G2211 COMPLEX E/M VISIT ADD ON: HCPCS | Performed by: INTERNAL MEDICINE

## 2025-04-03 PROCEDURE — 3008F BODY MASS INDEX DOCD: CPT | Performed by: INTERNAL MEDICINE

## 2025-04-03 PROCEDURE — 3078F DIAST BP <80 MM HG: CPT | Performed by: INTERNAL MEDICINE

## 2025-04-03 ASSESSMENT — PAIN SCALES - GENERAL: PAINLEVEL_OUTOF10: 4

## 2025-04-03 NOTE — PATIENT INSTRUCTIONS
Welcome to Dr. [facility].  Dr. Foster sees patients at the following sites:  [place] [address] [facility] at [facility]  [place], [address] at [facility] at [facility], [facility] [address]    Dr. Foster's hepatology care coordinator, Ruba JEAN-BAPTISTE, can be reached at [phone].  Dr. Foster's , Kelley Carter, can be reached at [phone].     Schedule an -[phone]    Ultrasound - in 5 months  Do not eat or drink anything 6 hours prior to your exam.  Call [phone] to schedule.    Labwork in 5 months    Continue Coreg twice daily. Call / contact my office when you need a refill.    Return to see me in 6 months

## 2025-04-03 NOTE — PROGRESS NOTES
Subjective     Cirrhosis, follow up appointment.    History of Present Illness:   Kisha Vincent is a 69 y.o. female who presents to the [facility] at [facility] for cirrhosis management.    Last appointment was > 2 years ago (January 27, 2022). She re-established in August 2024.  She has developed CKD. Sees Dr. Kendrick. for Nephrology.    Has had kidney stones (R). Required treatment with ?lithotrypsy.  Dr. Garcia     Off metformin, due to diarrhea.  Now taking Trulicity.    At her last appointment with me she had progression of portal hypertension and esophageal varices, which required variceal band ligation.  She had some transient dysphagia after her last banding procedure.  Switched from Nadolol to Coreg.     Her  was diagnosed with metastatic colon cancer about 2 years ago.  He appears to be doing okay despite his advanced cancer.  The patient was overwhelmed with the care of her  and stopped coming to her hepatology appointments. Kisha underwent a colonoscopy on 6/25/24 and had a 9 mm polyp resected from the sigmoid colon.    She seems to be doing well.  She is lost a lot of weight.  Her mobility seems to be better than it was in the past. She denies any decompensating events.  She denies any confusion episodes or hepatic encephalopathy.  She denies any abdominal distention and ascites.    Review of Systems  Review of Systems  Negative ROS stated above.    Past Medical History   has a past medical history of Abdominal distension (gaseous) (02/13/2020), Osteopetrosis (HHS-HCC), Personal history of other diseases of the nervous system and sense organs, Personal history of other diseases of the nervous system and sense organs, Personal history of other endocrine, nutritional and metabolic disease, Personal history of other endocrine, nutritional and metabolic disease, Personal history of other infectious and parasitic diseases, and Thrombocytopenia, unspecified (CMS-HCC).      Social History   reports that she has quit smoking. Her smoking use included cigarettes. She has never used smokeless tobacco. She reports that she does not currently use alcohol. She reports that she does not use drugs.     Family History  family history includes Breast cancer (age of onset: 60) in her paternal grandmother; Breast cancer (age of onset: 80) in her mother; Diabetes in her mother and paternal grandmother; Hypertension in her father; Lung cancer in her father; Thyroid disease in her maternal grandmother.     Allergies  Allergies   Allergen Reactions    Latex, Natural Rubber Other and Unknown     sensitivity    Adhesive Tape-Silicones Rash    Ampicillin Hives and Rash    Cefazolin Hives, Rash and Unknown       Medications  Current Outpatient Medications   Medication Instructions    acetaminophen (TYLENOL) 500 mg, Every 6 hours PRN    albuterol 90 mcg/actuation inhaler 1 puff, inhalation, Every 4 hours PRN    atorvastatin (LIPITOR) 40 mg, oral, Daily    carvedilol (COREG) 6.25 mg, oral, 2 times daily    FreeStyle Lite Strips strip USE TO TEST BLOOD SUGAR ONCE DAILY    IRON, FERROUS SULFATE, ORAL 1 tablet, Daily    losartan (COZAAR) 100 mg, oral, Daily    metFORMIN XR (GLUCOPHAGE-XR) 500 mg, oral, 2 times daily (morning and late afternoon)    Synthroid 175 mcg, oral, Daily, Take on an empty stomach at the same time each day, either 30 to 60 minutes prior to breakfast    Trulicity 0.75 mg, subcutaneous, Weekly    vit A/vit C/vit E/zinc/copper (ICAPS AREDS ORAL) 1 tablet    zinc gluconate 50 mg tablet 50 mg of elemental zinc, Daily        Objective   Visit Vitals  /71   Pulse 92   Temp 36.8 °C (98.3 °F)   Resp 20          8/30/2024     2:45 PM 8/30/2024     3:00 PM 8/30/2024     3:15 PM 8/30/2024     3:30 PM 1/30/2025     3:06 PM 3/11/2025     2:01 PM 4/3/2025     4:12 PM   Vitals   Systolic 100 108 139 123 135 116 118   Diastolic 59 89 89 88 85 70 71   BP Location     Left arm     Heart Rate 71  "71 69 71 78 70 92   Temp     36.4 °C (97.5 °F) 36.7 °C (98 °F) 36.8 °C (98.3 °F)   Resp 20 19 18 19 16  20   Height     1.499 m (4' 11\")  1.499 m (4' 11\")   Weight (lb)     152.78 147 145   BMI     30.86 kg/m2 29.69 kg/m2 29.29 kg/m2   BSA (m2)     1.7 m2 1.67 m2 1.66 m2   Visit Report     Report Report Report     Physical Exam  Constitutional   General appearance: In no acute distress . appears older than stated age.   Eyes   Anicteric Sclerae .   Pulmonary   Auscultation of lungs: Clear.    Cardiovascular   Auscultation of heart: RRR without murmur.     Examination of extremities for edema: Normal.    Abdomen   Soft, non-tender.     Bowel sounds normal.    + hepatomegaly/firm liver edge. No splenomegaly.      Labs    WBC   Date/Time Value Ref Range Status   01/31/2025 08:05 AM 3.0 (L) 4.4 - 11.3 x10*3/uL Final     Hemoglobin   Date/Time Value Ref Range Status   01/31/2025 08:05 AM 11.0 (L) 12.0 - 16.0 g/dL Final     Hematocrit   Date/Time Value Ref Range Status   01/31/2025 08:05 AM 37.3 36.0 - 46.0 % Final     MCV   Date/Time Value Ref Range Status   01/31/2025 08:05 AM 92 80 - 100 fL Final     Platelets   Date/Time Value Ref Range Status   01/31/2025 08:05 AM 53 (L) 150 - 450 x10*3/uL Final        Total Protein   Date/Time Value Ref Range Status   01/30/2025 03:43 PM 6.9 6.4 - 8.2 g/dL Final     Albumin   Date/Time Value Ref Range Status   01/30/2025 03:43 PM 4.5 3.4 - 5.0 g/dL Final     AST   Date/Time Value Ref Range Status   01/30/2025 03:43 PM 22 9 - 39 U/L Final     ALT   Date/Time Value Ref Range Status   01/30/2025 03:43 PM 25 7 - 45 U/L Final     Comment:     Patients treated with Sulfasalazine may generate falsely decreased results for ALT.     Alkaline Phosphatase   Date/Time Value Ref Range Status   01/30/2025 03:43  33 - 136 U/L Final     Bilirubin, Total   Date/Time Value Ref Range Status   01/30/2025 03:43 PM 0.5 0.0 - 1.2 mg/dL Final     Bilirubin, Direct   Date/Time Value Ref Range Status "   05/28/2022 10:00 AM 0.1 0.0 - 0.3 mg/dL Final     GGT   Date/Time Value Ref Range Status   01/04/2018 04:07  (H) 5 - 55 U/L Final     Comment:     Performed at [address] [address]      Vitamin D, 25-Hydroxy, Total   Date/Time Value Ref Range Status   10/25/2024 08:47 AM 44 30 - 100 ng/mL Final      AST   Date/Time Value Ref Range Status   01/30/2025 03:43 PM 22 9 - 39 U/L Final     ALT   Date/Time Value Ref Range Status   01/30/2025 03:43 PM 25 7 - 45 U/L Final     Comment:     Patients treated with Sulfasalazine may generate falsely decreased results for ALT.     Alkaline Phosphatase   Date/Time Value Ref Range Status   01/30/2025 03:43  33 - 136 U/L Final     Bilirubin, Total   Date/Time Value Ref Range Status   01/30/2025 03:43 PM 0.5 0.0 - 1.2 mg/dL Final     Bilirubin, Direct   Date/Time Value Ref Range Status   05/28/2022 10:00 AM 0.1 0.0 - 0.3 mg/dL Final     GGT   Date/Time Value Ref Range Status   01/04/2018 04:07  (H) 5 - 55 U/L Final     Comment:     Performed at [address] [address]     Albumin   Date/Time Value Ref Range Status   01/30/2025 03:43 PM 4.5 3.4 - 5.0 g/dL Final     Total Protein   Date/Time Value Ref Range Status   01/30/2025 03:43 PM 6.9 6.4 - 8.2 g/dL Final        PT   Date/Time Value Ref Range Status   02/11/2025 04:08 PM 10.3 9.0 - 11.5 sec Final     Comment:     For additional information, please refer to  http://education.[employer]/faq/GLW180  (This link is being provided for informational/  educational purposes only.)       INR   Date/Time Value Ref Range Status   02/11/2025 04:08 PM 1.0  Final     Comment:     Reference Range                     0.9-1.1  Moderate-intensity Warfarin Therapy 2.0-3.0  Higher-intensity Warfarin Therapy   3.0-4.0           LIVER US with Dopplers  8/8/2024    IMPRESSION:  Borderline size of the liver. Diffuse hepatocellular disease changes may represent a combination of  fatty infiltration and cirrhosis. No focal hepatic lesion demonstrated.      Patency of the hepatic vasculature with antegrade flow throughout.      Splenomegaly and enlarged main portal vein compatible with portal hypertension.      Trace perihepatic free fluid.      Nonspecific gallbladder wall thickening. No demonstrated gallstones or biliary dilation.    Liver US 2/22/25  Diffuse coarsened hyperechoic appearance of the hepatic parenchyma  and lobulation of the surface contour is again seen. No focal hepatic  lesion is demonstrated. Liver measures approximately 17 cm in  sagittal dimension.    EGD  2021  Impression:      - Normal hypopharynx.                         - Z-line regular, 35 cm from the incisors.                         - Grade II esophageal varices with no stigmata of recent bleeding. Incompletely eradicated. Banded.                         - Portal hypertensive gastropathy.                         - Normal duodenal bulb, first portion of th duodenum  and second portion of the duodenum.    EGD 8/30/2024    Impression  Four or more large and tortuous grade III varices (red angelica sign) in the esophagus; there was oozing blood; bleeding was controlled; placed 5 bands successfully, resulting in partial eradication  Portal hypertensive gastropathy in the cardia, fundus of the stomach, body of the stomach, incisura, antrum and prepyloric region  Moderate edematous, erythematous, friable, mosaic mucosa in the duodenal bulb and 1st part of the duodenum  The 2nd part of the duodenum appeared normal.       MELD 3.0: 11 at 7/31/2024 10:09 AM  MELD-Na: 10 at 7/31/2024 10:09 AM  Calculated from:  Serum Creatinine: 1.4 mg/dL at 7/31/2024 10:09 AM  Serum Sodium: 139 mmol/L (Using max of 137 mmol/L) at 7/31/2024 10:09 AM  Total Bilirubin: 0.6 mg/dL (Using min of 1 mg/dL) at 7/31/2024 10:09 AM  Serum Albumin: 4.2 g/dL (Using max of 3.5 g/dL) at 7/31/2024 10:09 AM  INR(ratio): 1 at 7/31/2024 10:09 AM  Age at listing  (hypothetical): 68 years  Sex: Female at 7/31/2024 10:09 AM    Assessment/Plan   Kisha Vincent is a 69 y.o. female who presents to Liver clinic for cirrhosis care.    MASLD - cirrhosis ( confirmed by elastography).   She has never had a liver biopsy.     She has esophageal varices on endoscopy, but no history of decompensation. She has abdominal distention, although in the past she's never had ascites.     Normal hepatic synthetic function.     She did develop sepsis and pneumonia in 2018.     Now with CKD.     Plan:  We did reinforced general recommendations for management of cirrhosis:  - We will acquire liver cancer screening with ultrasound (which will assess her gallbladder as well) and alpha-fetoprotein  - Regular lab work to monitor liver function.  - We will schedule her for follow-up endoscopy.  - Continue Coreg 6.25 mg BID  - 6 month follow up.  There is no need for liver transplant consideration at this time - due to stable liver function.    Instructions      Perry Foster MD

## 2025-04-03 NOTE — LETTER
April 9, 2025     Natividad Nava MD  [id number] [facility]  [facility] [address]    Patient: Kisha Vincent   YOB: 1955   Date of Visit: 4/3/2025       Dear Dr. Natividad Nava MD:    Thank you for referring Kisha Vincent to me for evaluation. Below are my notes for this consultation.  If you have questions, please do not hesitate to call me. I look forward to following your patient along with you.       Sincerely,     Perry Foster MD      CC: No Recipients  ______________________________________________________________________________________    Subjective     Cirrhosis, follow up appointment.    History of Present Illness:   Kisha Vincent is a 69 y.o. female who presents to the [facility] at [facility] for cirrhosis management.    Last appointment was > 2 years ago (January 27, 2022). She re-established in August 2024.  She has developed CKD. Sees Dr. Kendrick. for Nephrology.    Has had kidney stones (R). Required treatment with ?lithotrypsy.  Dr. Garcia     Off metformin, due to diarrhea.  Now taking Trulicity.    At her last appointment with me she had progression of portal hypertension and esophageal varices, which required variceal band ligation.  She had some transient dysphagia after her last banding procedure.  Switched from Nadolol to Coreg.     Her  was diagnosed with metastatic colon cancer about 2 years ago.  He appears to be doing okay despite his advanced cancer.  The patient was overwhelmed with the care of her  and stopped coming to her hepatology appointments. Kisha underwent a colonoscopy on 6/25/24 and had a 9 mm polyp resected from the sigmoid colon.    She seems to be doing well.  She is lost a lot of weight.  Her mobility seems to be better than it was in the past. She denies any decompensating events.  She denies any confusion episodes or hepatic encephalopathy.  She denies any abdominal distention and  ascites.    Review of Systems  Review of Systems  Negative ROS stated above.    Past Medical History   has a past medical history of Abdominal distension (gaseous) (02/13/2020), Osteopetrosis (HHS-HCC), Personal history of other diseases of the nervous system and sense organs, Personal history of other diseases of the nervous system and sense organs, Personal history of other endocrine, nutritional and metabolic disease, Personal history of other endocrine, nutritional and metabolic disease, Personal history of other infectious and parasitic diseases, and Thrombocytopenia, unspecified (CMS-HCC).     Social History   reports that she has quit smoking. Her smoking use included cigarettes. She has never used smokeless tobacco. She reports that she does not currently use alcohol. She reports that she does not use drugs.     Family History  family history includes Breast cancer (age of onset: 60) in her paternal grandmother; Breast cancer (age of onset: 80) in her mother; Diabetes in her mother and paternal grandmother; Hypertension in her father; Lung cancer in her father; Thyroid disease in her maternal grandmother.     Allergies  Allergies   Allergen Reactions   • Latex, Natural Rubber Other and Unknown     sensitivity   • Adhesive Tape-Silicones Rash   • Ampicillin Hives and Rash   • Cefazolin Hives, Rash and Unknown       Medications  Current Outpatient Medications   Medication Instructions   • acetaminophen (TYLENOL) 500 mg, Every 6 hours PRN   • albuterol 90 mcg/actuation inhaler 1 puff, inhalation, Every 4 hours PRN   • atorvastatin (LIPITOR) 40 mg, oral, Daily   • carvedilol (COREG) 6.25 mg, oral, 2 times daily   • FreeStyle Lite Strips strip USE TO TEST BLOOD SUGAR ONCE DAILY   • IRON, FERROUS SULFATE, ORAL 1 tablet, Daily   • losartan (COZAAR) 100 mg, oral, Daily   • metFORMIN XR (GLUCOPHAGE-XR) 500 mg, oral, 2 times daily (morning and late afternoon)   • Synthroid 175 mcg, oral, Daily, Take on an empty  "stomach at the same time each day, either 30 to 60 minutes prior to breakfast   • Trulicity 0.75 mg, subcutaneous, Weekly   • vit A/vit C/vit E/zinc/copper (ICAPS AREDS ORAL) 1 tablet   • zinc gluconate 50 mg tablet 50 mg of elemental zinc, Daily        Objective   Visit Vitals  /71   Pulse 92   Temp 36.8 °C (98.3 °F)   Resp 20          8/30/2024     2:45 PM 8/30/2024     3:00 PM 8/30/2024     3:15 PM 8/30/2024     3:30 PM 1/30/2025     3:06 PM 3/11/2025     2:01 PM 4/3/2025     4:12 PM   Vitals   Systolic 100 108 139 123 135 116 118   Diastolic 59 89 89 88 85 70 71   BP Location     Left arm     Heart Rate 71 71 69 71 78 70 92   Temp     36.4 °C (97.5 °F) 36.7 °C (98 °F) 36.8 °C (98.3 °F)   Resp 20 19 18 19 16  20   Height     1.499 m (4' 11\")  1.499 m (4' 11\")   Weight (lb)     152.78 147 145   BMI     30.86 kg/m2 29.69 kg/m2 29.29 kg/m2   BSA (m2)     1.7 m2 1.67 m2 1.66 m2   Visit Report     Report Report Report     Physical Exam  Constitutional   General appearance: In no acute distress . appears older than stated age.   Eyes   Anicteric Sclerae .   Pulmonary   Auscultation of lungs: Clear.    Cardiovascular   Auscultation of heart: RRR without murmur.     Examination of extremities for edema: Normal.    Abdomen   Soft, non-tender.     Bowel sounds normal.    + hepatomegaly/firm liver edge. No splenomegaly.      Labs    WBC   Date/Time Value Ref Range Status   01/31/2025 08:05 AM 3.0 (L) 4.4 - 11.3 x10*3/uL Final     Hemoglobin   Date/Time Value Ref Range Status   01/31/2025 08:05 AM 11.0 (L) 12.0 - 16.0 g/dL Final     Hematocrit   Date/Time Value Ref Range Status   01/31/2025 08:05 AM 37.3 36.0 - 46.0 % Final     MCV   Date/Time Value Ref Range Status   01/31/2025 08:05 AM 92 80 - 100 fL Final     Platelets   Date/Time Value Ref Range Status   01/31/2025 08:05 AM 53 (L) 150 - 450 x10*3/uL Final        Total Protein   Date/Time Value Ref Range Status   01/30/2025 03:43 PM 6.9 6.4 - 8.2 g/dL Final "     Albumin   Date/Time Value Ref Range Status   01/30/2025 03:43 PM 4.5 3.4 - 5.0 g/dL Final     AST   Date/Time Value Ref Range Status   01/30/2025 03:43 PM 22 9 - 39 U/L Final     ALT   Date/Time Value Ref Range Status   01/30/2025 03:43 PM 25 7 - 45 U/L Final     Comment:     Patients treated with Sulfasalazine may generate falsely decreased results for ALT.     Alkaline Phosphatase   Date/Time Value Ref Range Status   01/30/2025 03:43  33 - 136 U/L Final     Bilirubin, Total   Date/Time Value Ref Range Status   01/30/2025 03:43 PM 0.5 0.0 - 1.2 mg/dL Final     Bilirubin, Direct   Date/Time Value Ref Range Status   05/28/2022 10:00 AM 0.1 0.0 - 0.3 mg/dL Final     GGT   Date/Time Value Ref Range Status   01/04/2018 04:07  (H) 5 - 55 U/L Final     Comment:     Performed at [address] [address]      Vitamin D, 25-Hydroxy, Total   Date/Time Value Ref Range Status   10/25/2024 08:47 AM 44 30 - 100 ng/mL Final      AST   Date/Time Value Ref Range Status   01/30/2025 03:43 PM 22 9 - 39 U/L Final     ALT   Date/Time Value Ref Range Status   01/30/2025 03:43 PM 25 7 - 45 U/L Final     Comment:     Patients treated with Sulfasalazine may generate falsely decreased results for ALT.     Alkaline Phosphatase   Date/Time Value Ref Range Status   01/30/2025 03:43  33 - 136 U/L Final     Bilirubin, Total   Date/Time Value Ref Range Status   01/30/2025 03:43 PM 0.5 0.0 - 1.2 mg/dL Final     Bilirubin, Direct   Date/Time Value Ref Range Status   05/28/2022 10:00 AM 0.1 0.0 - 0.3 mg/dL Final     GGT   Date/Time Value Ref Range Status   01/04/2018 04:07  (H) 5 - 55 U/L Final     Comment:     Performed at [place] [address] [place] [facility] [address]     Albumin   Date/Time Value Ref Range Status   01/30/2025 03:43 PM 4.5 3.4 - 5.0 g/dL Final     Total Protein   Date/Time Value Ref Range Status   01/30/2025 03:43 PM 6.9 6.4 - 8.2 g/dL Final        PT   Date/Time Value Ref Range  Status   02/11/2025 04:08 PM 10.3 9.0 - 11.5 sec Final     Comment:     For additional information, please refer to  http://education.[employer]/faq/IBM674  (This link is being provided for informational/  educational purposes only.)       INR   Date/Time Value Ref Range Status   02/11/2025 04:08 PM 1.0  Final     Comment:     Reference Range                     0.9-1.1  Moderate-intensity Warfarin Therapy 2.0-3.0  Higher-intensity Warfarin Therapy   3.0-4.0           LIVER US with Dopplers  8/8/2024    IMPRESSION:  Borderline size of the liver. Diffuse hepatocellular disease changes may represent a combination of fatty infiltration and cirrhosis. No focal hepatic lesion demonstrated.      Patency of the hepatic vasculature with antegrade flow throughout.      Splenomegaly and enlarged main portal vein compatible with portal hypertension.      Trace perihepatic free fluid.      Nonspecific gallbladder wall thickening. No demonstrated gallstones or biliary dilation.    Liver US 2/22/25  Diffuse coarsened hyperechoic appearance of the hepatic parenchyma  and lobulation of the surface contour is again seen. No focal hepatic  lesion is demonstrated. Liver measures approximately 17 cm in  sagittal dimension.    EGD  2021  Impression:      - Normal hypopharynx.                         - Z-line regular, 35 cm from the incisors.                         - Grade II esophageal varices with no stigmata of recent bleeding. Incompletely eradicated. Banded.                         - Portal hypertensive gastropathy.                         - Normal duodenal bulb, first portion of th duodenum  and second portion of the duodenum.    EGD 8/30/2024    Impression  Four or more large and tortuous grade III varices (red angelica sign) in the esophagus; there was oozing blood; bleeding was controlled; placed 5 bands successfully, resulting in partial eradication  Portal hypertensive gastropathy in the cardia, fundus of the stomach,  body of the stomach, incisura, antrum and prepyloric region  Moderate edematous, erythematous, friable, mosaic mucosa in the duodenal bulb and 1st part of the duodenum  The 2nd part of the duodenum appeared normal.       MELD 3.0: 11 at 7/31/2024 10:09 AM  MELD-Na: 10 at 7/31/2024 10:09 AM  Calculated from:  Serum Creatinine: 1.4 mg/dL at 7/31/2024 10:09 AM  Serum Sodium: 139 mmol/L (Using max of 137 mmol/L) at 7/31/2024 10:09 AM  Total Bilirubin: 0.6 mg/dL (Using min of 1 mg/dL) at 7/31/2024 10:09 AM  Serum Albumin: 4.2 g/dL (Using max of 3.5 g/dL) at 7/31/2024 10:09 AM  INR(ratio): 1 at 7/31/2024 10:09 AM  Age at listing (hypothetical): 68 years  Sex: Female at 7/31/2024 10:09 AM    Assessment/Plan   Kisha Vincent is a 69 y.o. female who presents to Liver clinic for cirrhosis care.    MASLD - cirrhosis ( confirmed by elastography).   She has never had a liver biopsy.     She has esophageal varices on endoscopy, but no history of decompensation. She has abdominal distention, although in the past she's never had ascites.     Normal hepatic synthetic function.     She did develop sepsis and pneumonia in 2018.     Now with CKD.     Plan:  We did reinforced general recommendations for management of cirrhosis:  - We will acquire liver cancer screening with ultrasound (which will assess her gallbladder as well) and alpha-fetoprotein  - Regular lab work to monitor liver function.  - We will schedule her for follow-up endoscopy.  - Continue Coreg 6.25 mg BID  - 6 month follow up.  There is no need for liver transplant consideration at this time - due to stable liver function.    Instructions      Perry Foster MD

## 2025-05-05 ENCOUNTER — TELEPHONE (OUTPATIENT)
Dept: PRIMARY CARE | Facility: CLINIC | Age: 70
End: 2025-05-05
Payer: MEDICARE

## 2025-05-05 DIAGNOSIS — Z87.891 PERSONAL HISTORY OF NICOTINE DEPENDENCE: Primary | ICD-10-CM

## 2025-05-05 NOTE — TELEPHONE ENCOUNTER
Called to inform pt of the order placed for a CT Lung Screening and to schedule her for the Medicare Wellness visit. Lvm for pt to cb.

## 2025-05-05 NOTE — TELEPHONE ENCOUNTER
See copy in message below.  I have entered a for CT lung cancer screening.  Please notify patient, if she no longer would like to continue with this recommended screening, I can remove the order.  She is a former smoker, but it is recommended that she continue this yearly screening for 15 years after her quit date in 2013.    She will also be due for her Medicare wellness visit with me on or after 5/15/2025.  Please assist in scheduling.      ----- Message from Jeni Pimentel sent at 4/30/2025  7:39 AM EDT -----  Regarding: LUNG CANCER SCREENING ORDER REQUEST  Kisha Vincent is due for an annual CT low dose lung screening on or after 5- and does not have a follow up order. If the patient still meets criteria and is interested in pursuing lung cancer screening, could you please place the appropriate order and please be sure to update the smoking history in Epic to reflect the accurate smoking history? ([id number]) / Z87.891 They can schedule this by calling [phone] or in MY Chart.If they decline further screening  or no longer meet criteria please let me know and I will remove them from our program database.Thank you, Jeni Pimentel, [facility] [phone]

## 2025-05-08 DIAGNOSIS — I10 PRIMARY HYPERTENSION: ICD-10-CM

## 2025-05-09 RX ORDER — LOSARTAN POTASSIUM 100 MG/1
100 TABLET ORAL DAILY
Qty: 90 TABLET | Refills: 0 | Status: SHIPPED | OUTPATIENT
Start: 2025-05-09

## 2025-05-16 LAB
T4 FREE SERPL-MCNC: 1.7 NG/DL (ref 0.8–1.8)
TSH SERPL-ACNC: 0.02 MIU/L (ref 0.4–4.5)

## 2025-05-17 DIAGNOSIS — E03.9 HYPOTHYROIDISM, UNSPECIFIED TYPE: ICD-10-CM

## 2025-05-19 ENCOUNTER — TELEPHONE (OUTPATIENT)
Dept: PRIMARY CARE | Facility: CLINIC | Age: 70
End: 2025-05-19
Payer: MEDICARE

## 2025-05-19 DIAGNOSIS — E03.9 HYPOTHYROIDISM, UNSPECIFIED TYPE: ICD-10-CM

## 2025-05-19 RX ORDER — LEVOTHYROXINE SODIUM 150 UG/1
150 TABLET ORAL DAILY
Qty: 90 TABLET | Refills: 0 | Status: SHIPPED | OUTPATIENT
Start: 2025-05-19 | End: 2025-05-21 | Stop reason: WASHOUT

## 2025-05-19 NOTE — TELEPHONE ENCOUNTER
Pt lvm stating she forgot to ask about her metformin asking if this had been re ordered by physician stating she told the pharmacy she was not taking any longer

## 2025-05-20 NOTE — TELEPHONE ENCOUNTER
Her diabetes is well-controlled with A1c less than 7.  If she has not been taking her metformin, okay to continue this and just take the Trulicity.  We can see how her A1c is doing in July at her next appointment, and adjust Trulicity dose and/or restart metformin as necessary.  Lab Results   Component Value Date    HGBA1C 6.1 (H) 03/12/2025

## 2025-05-21 RX ORDER — LEVOTHYROXINE SODIUM 150 UG/1
150 TABLET ORAL DAILY
Qty: 90 TABLET | Refills: 0 | Status: SHIPPED | OUTPATIENT
Start: 2025-05-21 | End: 2026-05-21

## 2025-05-21 NOTE — TELEPHONE ENCOUNTER
"Pt called in stating that Levothyroxine was sent in to her pharmacy. Pt states \"she CAN NOT have the generic brand\" because there is something in the medication that she can not have and she has been taking Synthroid for years. Asking for a refill on Synthroid. I called and informed the patient to continue without the Metformin and continue taking the Trulicity as instructed by PCP. Pt expressed understanding. Please advise.  "

## 2025-05-23 DIAGNOSIS — E03.9 HYPOTHYROIDISM, UNSPECIFIED TYPE: ICD-10-CM

## 2025-05-26 RX ORDER — ATORVASTATIN CALCIUM 40 MG/1
40 TABLET, FILM COATED ORAL DAILY
Qty: 90 TABLET | Refills: 0 | Status: SHIPPED | OUTPATIENT
Start: 2025-05-26

## 2025-06-03 ENCOUNTER — APPOINTMENT (OUTPATIENT)
Dept: RADIOLOGY | Facility: CLINIC | Age: 70
End: 2025-06-03
Payer: MEDICARE

## 2025-06-03 ENCOUNTER — HOSPITAL ENCOUNTER (OUTPATIENT)
Dept: RADIOLOGY | Facility: CLINIC | Age: 70
Discharge: HOME | End: 2025-06-03
Payer: MEDICARE

## 2025-06-03 DIAGNOSIS — Z87.891 PERSONAL HISTORY OF NICOTINE DEPENDENCE: ICD-10-CM

## 2025-06-03 PROCEDURE — 71271 CT THORAX LUNG CANCER SCR C-: CPT | Performed by: RADIOLOGY

## 2025-06-03 PROCEDURE — 71271 CT THORAX LUNG CANCER SCR C-: CPT

## 2025-06-06 DIAGNOSIS — E11.40 TYPE 2 DIABETES MELLITUS WITH DIABETIC NEUROPATHY, WITHOUT LONG-TERM CURRENT USE OF INSULIN: ICD-10-CM

## 2025-06-06 RX ORDER — BLOOD-GLUCOSE METER
1 KIT MISCELLANEOUS 2 TIMES DAILY
Qty: 200 STRIP | Refills: 3 | Status: SHIPPED | OUTPATIENT
Start: 2025-06-06

## 2025-06-06 NOTE — TELEPHONE ENCOUNTER
Pt agrees to restart metformin, [place] [facility] [place] Pt also needs refill of her test strips. States she tests BID. Rx populated.

## 2025-06-06 NOTE — TELEPHONE ENCOUNTER
Would recommend re-starting metformin.  Does she still have this medication at home?  Can send new rx if needed.    Would restart medication by taking one 500 mg tablet daily for 3 days and then increase dose to one 500 mg tablet twice daily (her previous dosing).

## 2025-06-09 DIAGNOSIS — Z76.0 MEDICATION REFILL: ICD-10-CM

## 2025-06-09 RX ORDER — METFORMIN HYDROCHLORIDE 500 MG/1
500 TABLET, EXTENDED RELEASE ORAL
Qty: 180 TABLET | Refills: 1 | Status: SHIPPED | OUTPATIENT
Start: 2025-06-09

## 2025-06-10 ENCOUNTER — TELEPHONE (OUTPATIENT)
Dept: PRIMARY CARE | Facility: CLINIC | Age: 70
End: 2025-06-10
Payer: MEDICARE

## 2025-06-10 NOTE — TELEPHONE ENCOUNTER
Patient lvm returning a missed call from the office. Patent has been called back informing her that the Rx was sent to her preferred pharmacy on file and the pharmacy has received it. No further actions at this time.

## 2025-06-10 NOTE — TELEPHONE ENCOUNTER
Pt lvm stating [facility] still have not received her metformin. Looks like this was sent yesterday 6/9/2025

## 2025-06-25 DIAGNOSIS — K74.60 ESOPHAGEAL VARICES IN CIRRHOSIS (MULTI): ICD-10-CM

## 2025-06-25 DIAGNOSIS — I85.10 ESOPHAGEAL VARICES IN CIRRHOSIS (MULTI): ICD-10-CM

## 2025-06-25 NOTE — TELEPHONE ENCOUNTER
Hepatology Nurse Coordinator Note - Prescription Refill Request  Medication Requested: Carvedilol  Last Office Visit with Dr. Foster: 4/3/2025  Upcoming Office Visit with Dr. Foster: 8/26/2025  Pharm[place] [facility] [address] [place]   Will forward request to Dr. Foster for approval.

## 2025-06-26 RX ORDER — CARVEDILOL 6.25 MG/1
6.25 TABLET ORAL 2 TIMES DAILY
Qty: 60 TABLET | Refills: 11 | Status: SHIPPED | OUTPATIENT
Start: 2025-06-26 | End: 2026-06-26

## 2025-07-02 ENCOUNTER — OFFICE VISIT (OUTPATIENT)
Dept: PRIMARY CARE | Facility: CLINIC | Age: 70
End: 2025-07-02
Payer: MEDICARE

## 2025-07-02 VITALS
SYSTOLIC BLOOD PRESSURE: 112 MMHG | DIASTOLIC BLOOD PRESSURE: 64 MMHG | WEIGHT: 150.2 LBS | OXYGEN SATURATION: 98 % | HEIGHT: 59 IN | TEMPERATURE: 99.1 F | HEART RATE: 73 BPM | BODY MASS INDEX: 30.28 KG/M2

## 2025-07-02 DIAGNOSIS — K76.0 METABOLIC DYSFUNCTION-ASSOCIATED STEATOTIC LIVER DISEASE (MASLD): ICD-10-CM

## 2025-07-02 DIAGNOSIS — E78.5 HYPERLIPIDEMIA, UNSPECIFIED HYPERLIPIDEMIA TYPE: ICD-10-CM

## 2025-07-02 DIAGNOSIS — Z87.891 PERSONAL HISTORY OF NICOTINE DEPENDENCE: ICD-10-CM

## 2025-07-02 DIAGNOSIS — K64.9 HEMORRHOIDS, UNSPECIFIED HEMORRHOID TYPE: ICD-10-CM

## 2025-07-02 DIAGNOSIS — G47.33 OSA (OBSTRUCTIVE SLEEP APNEA): ICD-10-CM

## 2025-07-02 DIAGNOSIS — Z00.00 ANNUAL PHYSICAL EXAM: ICD-10-CM

## 2025-07-02 DIAGNOSIS — E55.9 VITAMIN D DEFICIENCY: ICD-10-CM

## 2025-07-02 DIAGNOSIS — Z00.00 MEDICARE ANNUAL WELLNESS VISIT, SUBSEQUENT: Primary | ICD-10-CM

## 2025-07-02 DIAGNOSIS — I10 PRIMARY HYPERTENSION: ICD-10-CM

## 2025-07-02 DIAGNOSIS — N18.32 TYPE 2 DIABETES MELLITUS WITH STAGE 3B CHRONIC KIDNEY DISEASE, WITHOUT LONG-TERM CURRENT USE OF INSULIN (MULTI): ICD-10-CM

## 2025-07-02 DIAGNOSIS — E11.22 TYPE 2 DIABETES MELLITUS WITH STAGE 3B CHRONIC KIDNEY DISEASE, WITHOUT LONG-TERM CURRENT USE OF INSULIN (MULTI): ICD-10-CM

## 2025-07-02 DIAGNOSIS — Z12.31 SCREENING MAMMOGRAM FOR BREAST CANCER: ICD-10-CM

## 2025-07-02 DIAGNOSIS — Z13.6 SCREENING FOR CARDIOVASCULAR CONDITION: ICD-10-CM

## 2025-07-02 PROCEDURE — 99397 PER PM REEVAL EST PAT 65+ YR: CPT | Mod: 25 | Performed by: STUDENT IN AN ORGANIZED HEALTH CARE EDUCATION/TRAINING PROGRAM

## 2025-07-02 PROCEDURE — 99215 OFFICE O/P EST HI 40 MIN: CPT | Performed by: STUDENT IN AN ORGANIZED HEALTH CARE EDUCATION/TRAINING PROGRAM

## 2025-07-02 PROCEDURE — 99214 OFFICE O/P EST MOD 30 MIN: CPT | Mod: 25 | Performed by: STUDENT IN AN ORGANIZED HEALTH CARE EDUCATION/TRAINING PROGRAM

## 2025-07-02 ASSESSMENT — PATIENT HEALTH QUESTIONNAIRE - PHQ9
2. FEELING DOWN, DEPRESSED OR HOPELESS: NOT AT ALL
SUM OF ALL RESPONSES TO PHQ9 QUESTIONS 1 AND 2: 0
1. LITTLE INTEREST OR PLEASURE IN DOING THINGS: NOT AT ALL

## 2025-07-02 ASSESSMENT — PAIN SCALES - GENERAL: PAINLEVEL_OUTOF10: 0-NO PAIN

## 2025-07-02 NOTE — PATIENT INSTRUCTIONS
Thank you for coming to see me today.    Go to the lab for fasting blood work, we will call you with all results.    Mammogram order entered today, call to schedule.    Future screening lung CT ordered for completion in June 2026, call to schedule.    Colorectal surgery referral entered today for further treatment/evaluation of hemorrhoid symptoms.    Routine follow-up/medication review with me in 3 months, sooner if needed.

## 2025-07-02 NOTE — PROGRESS NOTES
MEDICARE WELLNESS    Chief Complaint   Patient presents with    Medicare Annual Wellness Visit Subsequent       HPI:  Kisha Vincent is a 69 y.o. female here  for a a Medicare Wellness Visit.  Last seen by me for diabetes follow-up on 3/11/2025, last Medicare wellness visit on 5/14/2024.    Hypertension:  Managed with carvedilol 6.25 mg BID and losartan 100 mg daily.  Patient reports compliance with all blood pressure medications as prescribed.  Denies symptoms of chest pain/pressure/palpitations.  Also without headache or vision changes.    Hyperlipidemia:  Managed with atorvastatin 40 mg daily along with lifestyle measures.    Lab Results   Component Value Date    CHOL 125 05/18/2024    CHOL 114 (L) 07/21/2023    CHOL 157 11/25/2022     Lab Results   Component Value Date    HDL 43.6 05/18/2024    HDL 46 (L) 07/21/2023    HDL 40.9 11/25/2022     Lab Results   Component Value Date    LDLCALC 44 05/18/2024    LDLCALC 39 (L) 07/21/2023    LDLCALC 63 (L) 11/06/2021     Lab Results   Component Value Date    TRIG 187 (H) 05/18/2024    TRIG 145 07/21/2023    TRIG 305 (H) 11/25/2022     DM2:  Glycemic Meds:    Trulicity no longer covered  Metformin  mg BID  ACEi/ARB:  losartan 100 mg  Statin:  atorvastatin 40 mg    Lab Results   Component Value Date    HGBA1C 6.1 (H) 03/12/2025     CKD:  Follows with     JAYNE:  Seen by sleep medicine on 7/3/2024 with recommendation for split-night PSG with CO2 monitoring to evaluate for JAYNE.  Changed her mind and didn't want to do the sleep study after the initial consultation.    MASLD-CIrrhosis:  Follows with GI (Perry Foster MD) in hepatology.  Last OV 4/3/2025.  Advised liver cancer screening with US and AFP, routine labs, follow up endoscopy due to hx of esophageal varices, continuation of Coreg, and 6 month follow up.  Stable liver function, no consideration of liver transplant.    Health Maintenance    Other Health Care Providers:  Medical record reviewed and discussed with  patient.      Social History:  Tobacco:  EtOH:  Patient reports routine vision checks and dental cleanings, and regular exercise/physical activity as tolerated.     Family History:  Family History[1]    Advanced Directives:  Counseled and discussed importance with patient during visit today.  Provided assistance as necessary.    Opioid Medications:  Does the patient use opioid medications: NO  Name of medication: N/A  If yes, do they take this medicine appropriately: N/A    Overall Health:  How does the patient rate their health status today:  GOOD    Cognitive Screen:  AAAx3  to person, place and time: YES  3 word recall: Banana, Chair, Sunrise  - Immediate recall: YES  - 5 minutes recall: YES  Impression: No cognitive deficiency observed during screening or encounter today     Vision/Hearing Screen:  Vision:  No acute vision concerns at visit today.  Hearing screen: reports no difficulty with hearing and passes finger rub test bilaterally    Immunizations:  COVID:  DUE  Tdap: utd  Pneumonia:  utd  Shingrix:  DUE    Immunization History   Administered Date(s) Administered    Flu vaccine, quadrivalent, high-dose, preservative free, age 65y+ (FLUZONE) 10/16/2020, 2022    Flu vaccine, trivalent, preservative free, HIGH-DOSE, age 65y+ (Fluzone) 2022    Influenza, Unspecified 2022    Pneumococcal conjugate vaccine, 20-valent (PREVNAR 20) 2024    Tdap vaccine, age 7 year and older (BOOSTRIX, ADACEL) 2024       Screenings:  HCV:  negative 2/15/2018 - utd  Pap:  Mammogram:  2024 wnl - DUE  Colonoscopy:   mm polyp removed, diverticulosis  DEXA:  2024  Lun/3/2025, quit , 40 pack-year hx    Reviewed:   Past Medical History/Allergies:  Yes  Family History:  Yes  Social History:  Yes  Current Medications:  Yes  Vital Signs:  Yes  Advanced Directives:  discussed  Immunizations:  reviewed today  Home Safety:                    Up & Go test > 30 seconds?  No                    Home have rugs; lack grab bars in bathroom; lack handrail on stairs; have poor lighting?  No                   Hearing difficulties?  No  Geriatric Assessment           ADL areas requiring assistance:  Does not need help with , Dressing, Eating, Ambulating, Toileting, Grooming, Hygiene.            IADL areas requiring assistance:  Does not need help with , Shopping, Housework, Accounting, Transportation, Driving.   Medications reviewed           Current supplements  Reviewed and recorded.     PHQ9/GAD7:     0/0    Current Medications  Current Outpatient Medications   Medication Instructions    acetaminophen (TYLENOL) 500 mg, Every 6 hours PRN    albuterol 90 mcg/actuation inhaler 1 puff, inhalation, Every 4 hours PRN    atorvastatin (LIPITOR) 40 mg, oral, Daily    blood sugar diagnostic (FreeStyle Lite Strips) 1 strip, subcutaneous, 2 times daily    carvedilol (COREG) 6.25 mg, oral, 2 times daily    IRON, FERROUS SULFATE, ORAL 1 tablet, Daily    losartan (COZAAR) 100 mg, oral, Daily    metFORMIN XR (GLUCOPHAGE-XR) 500 mg, oral, 2 times daily (morning and late afternoon)    Synthroid 150 mcg, oral, Daily, Take on an empty stomach at the same time each day, either 30 to 60 minutes prior to breakfast    Trulicity 1.5 mg, subcutaneous, Weekly    vit A/vit C/vit E/zinc/copper (ICAPS AREDS ORAL) 1 tablet    zinc gluconate 50 mg tablet 50 mg of elemental zinc, Daily        History  Allergies[2]   Medical History[3]   Surgical History[4]  Family History[5]  Social History[6]  Tobacco Use: Medium Risk (7/2/2025)    Patient History     Smoking Tobacco Use: Former     Smokeless Tobacco Use: Never     Passive Exposure: Not on file        ROS  All pertinent positive symptoms are included in the history of present illness.  All other systems have been reviewed and are negative and noncontributory to this patient's current ailments.    VITAL SIGNS  Vitals:    07/02/25 1326   BP: 112/64   Pulse: 73   Temp: 37.3 °C (99.1 °F)   SpO2:  98%     Vitals:    07/02/25 1326   Weight: 68.1 kg (150 lb 3.2 oz)      Body mass index is 30.34 kg/m².     PHYSICAL EXAM    GENERAL  Well-appearing, pleasant and cooperative.  No acute distress.    HEENT  HEAD:   Normocephalic.  Atraumatic.  EYES:  PERRLA.  No scleral icterus or conjunctival injection.  EARS:  Tympanic membranes visualized bilaterally without erythema, fluid, or bulging.  NECK:  No adenopathy.  No palpable thyroid enlargement or nodules.    THROAT:  Moist oropharynx without tonsillar enlargement or exudates.    LUNGS:    Clear to auscultation bilaterally.  No wheezes, rales, rhonchi.    CARDIAC:  Regular rate and rhythm.  Normal S1S2.  No murmurs/rubs/gallops.    ABDOMEN:  Soft, non-tender, non-distended.  No hepatosplenomegaly.  Normoactive bowel sounds.    MUSCULOSKELETAL:  No gross abnormalities.   No joint swelling or erythema,.  No spinal or paraspinal tenderness to palpation.    EXTREMITIES:  No LE edema or cyanosis.      NEURO           Alert and oriented x3. No focal deficits.    PSYCH:          Affect appropriate.     Preventative Services reviewed with patient, instructions provided    Assessment/Plan   Problem List Items Addressed This Visit       Diabetes mellitus, type 2 (Multi)    Continue current management.  A1c ordered today.         Relevant Orders    Hemoglobin A1c    Albumin-Creatinine Ratio, Urine Random    Follow Up In Primary Care - Established    Hyperlipidemia    Continue atorvastatin 40 mg daily along with lifestyle measures.         Primary hypertension    Well controlled, asymptomatic.  Continue carvedilol 6.25 mg BID and losartan 100 mg daily.         Relevant Orders    Follow Up In Primary Care - Established    Vitamin D deficiency    Relevant Orders    Vitamin D 25-Hydroxy,Total (for eval of Vitamin D levels)     Other Visit Diagnoses         Medicare annual wellness visit, subsequent    -  Primary      Annual physical exam        Relevant Orders    TSH with reflex to  Free T4 if abnormal    Lipid Panel    CBC    Comprehensive Metabolic Panel      Screening for cardiovascular condition        Relevant Orders    Lipid Panel      Screening mammogram for breast cancer        Relevant Orders    BI mammo bilateral screening tomosynthesis      Metabolic dysfunction-associated steatotic liver disease (MASLD)        Relevant Orders    CBC    Comprehensive Metabolic Panel      JAYNE (obstructive sleep apnea)          Hemorrhoids, unspecified hemorrhoid type        Relevant Orders    Referral to Colorectal Surgery      Personal history of nicotine dependence        Relevant Orders    CT lung screening low dose          Patient Instructions   Thank you for coming to see me today.    Go to the lab for fasting blood work, we will call you with all results.    Mammogram order entered today, call to schedule.    Future screening lung CT ordered for completion in June 2026, call to schedule.    Colorectal surgery referral entered today for further treatment/evaluation of hemorrhoid symptoms.    Routine follow-up/medication review with me in 3 months, sooner if needed.    Counseling:   Medication education:    Education: The patient is counseled regarding potential side effects of all new medications.    Understanding:  Patient expressed understanding.    Adherence:  No barriers to adherence identified.       Natividad Nava MD         [1]   Family History  Problem Relation Name Age of Onset    Diabetes Mother      Breast cancer Mother  80    Hypertension Father      Lung cancer Father      Thyroid disease Maternal Grandmother      Diabetes Paternal Grandmother      Breast cancer Paternal Grandmother  60   [2]   Allergies  Allergen Reactions    Latex, Natural Rubber Other and Unknown     sensitivity    Adhesive Tape-Silicones Rash    Ampicillin Hives and Rash    Cefazolin Hives, Rash and Unknown   [3]   Past Medical History:  Diagnosis Date    Abdominal distension (gaseous) 02/13/2020    Abdominal  distention    Osteopetrosis (HHS-HCC)     Osteopetrosis    Personal history of other diseases of the nervous system and sense organs     History of neuropathy    Personal history of other diseases of the nervous system and sense organs     History of sleep apnea    Personal history of other endocrine, nutritional and metabolic disease     History of hypothyroidism    Personal history of other endocrine, nutritional and metabolic disease     History of type 2 diabetes mellitus    Personal history of other infectious and parasitic diseases     History of sepsis    Thrombocytopenia, unspecified     Temporary low platelet count   [4]   Past Surgical History:  Procedure Laterality Date    CATARACT EXTRACTION  02/15/2018    Cataract Surgery     SECTION, CLASSIC  02/15/2018     Section    OTHER SURGICAL HISTORY  2020    Cervical biopsy procedure colposcopic    TUBAL LIGATION  02/15/2018    Tubal Ligation    US GUIDED PERCUTANEOUS PLACEMENT  8/10/2021    US GUIDED PERCUTANEOUS PLACEMENT [facility]   [5]   Family History  Problem Relation Name Age of Onset    Diabetes Mother      Breast cancer Mother  80    Hypertension Father      Lung cancer Father      Thyroid disease Maternal Grandmother      Diabetes Paternal Grandmother      Breast cancer Paternal Grandmother  60   [6]   Social History  Tobacco Use    Smoking status: Former     Types: Cigarettes    Smokeless tobacco: Never    Tobacco comments:     Quit , over 1 ppd for 40 years   Vaping Use    Vaping status: Never Used   Substance Use Topics    Alcohol use: Not Currently    Drug use: Never

## 2025-07-09 LAB
25(OH)D3+25(OH)D2 SERPL-MCNC: 27 NG/ML (ref 30–100)
ALBUMIN SERPL-MCNC: 3.8 G/DL (ref 3.6–5.1)
ALBUMIN/CREAT UR: 5 MG/G CREAT
ALP SERPL-CCNC: 107 U/L (ref 37–153)
ALT SERPL-CCNC: 15 U/L (ref 6–29)
ANION GAP SERPL CALCULATED.4IONS-SCNC: 6 MMOL/L (CALC) (ref 7–17)
AST SERPL-CCNC: 14 U/L (ref 10–35)
BILIRUB SERPL-MCNC: 0.5 MG/DL (ref 0.2–1.2)
BUN SERPL-MCNC: 28 MG/DL (ref 7–25)
CALCIUM SERPL-MCNC: 9.1 MG/DL (ref 8.6–10.4)
CHLORIDE SERPL-SCNC: 110 MMOL/L (ref 98–110)
CHOLEST SERPL-MCNC: 99 MG/DL
CHOLEST/HDLC SERPL: 2.2 (CALC)
CO2 SERPL-SCNC: 24 MMOL/L (ref 20–32)
CREAT SERPL-MCNC: 1.88 MG/DL (ref 0.5–1.05)
CREAT UR-MCNC: 79 MG/DL (ref 20–275)
EGFRCR SERPLBLD CKD-EPI 2021: 29 ML/MIN/1.73M2
ERYTHROCYTE [DISTWIDTH] IN BLOOD BY AUTOMATED COUNT: 13.5 % (ref 11–15)
EST. AVERAGE GLUCOSE BLD GHB EST-MCNC: 131 MG/DL
EST. AVERAGE GLUCOSE BLD GHB EST-SCNC: 7.3 MMOL/L
GLUCOSE SERPL-MCNC: 112 MG/DL (ref 65–99)
HBA1C MFR BLD: 6.2 %
HCT VFR BLD AUTO: 32 % (ref 35–45)
HDLC SERPL-MCNC: 46 MG/DL
HGB BLD-MCNC: 10 G/DL (ref 11.7–15.5)
LDLC SERPL CALC-MCNC: 40 MG/DL (CALC)
MCH RBC QN AUTO: 28.6 PG (ref 27–33)
MCHC RBC AUTO-ENTMCNC: 31.3 G/DL (ref 32–36)
MCV RBC AUTO: 91.4 FL (ref 80–100)
MICROALBUMIN UR-MCNC: 0.4 MG/DL
NONHDLC SERPL-MCNC: 53 MG/DL (CALC)
PLATELET # BLD AUTO: 47 THOUSAND/UL (ref 140–400)
PMV BLD REES-ECKER: 11.1 FL (ref 7.5–12.5)
POTASSIUM SERPL-SCNC: 4.8 MMOL/L (ref 3.5–5.3)
PROT SERPL-MCNC: 6 G/DL (ref 6.1–8.1)
RBC # BLD AUTO: 3.5 MILLION/UL (ref 3.8–5.1)
SODIUM SERPL-SCNC: 140 MMOL/L (ref 135–146)
T4 FREE SERPL-MCNC: 1.5 NG/DL (ref 0.8–1.8)
T4 FREE SERPL-MCNC: 1.6 NG/DL (ref 0.8–1.8)
TRIGL SERPL-MCNC: 58 MG/DL
TSH SERPL-ACNC: 0.03 MIU/L (ref 0.4–4.5)
TSH SERPL-ACNC: 0.03 MIU/L (ref 0.4–4.5)
WBC # BLD AUTO: 2.5 THOUSAND/UL (ref 3.8–10.8)

## 2025-07-11 RX ORDER — LEVOTHYROXINE SODIUM 137 UG/1
137 TABLET ORAL DAILY
Qty: 90 TABLET | Refills: 0 | Status: SHIPPED | OUTPATIENT
Start: 2025-07-11 | End: 2026-07-11

## 2025-07-14 DIAGNOSIS — E03.9 HYPOTHYROIDISM, UNSPECIFIED TYPE: ICD-10-CM

## 2025-07-15 ENCOUNTER — APPOINTMENT (OUTPATIENT)
Dept: RADIOLOGY | Facility: CLINIC | Age: 70
End: 2025-07-15
Payer: MEDICARE

## 2025-07-15 VITALS — WEIGHT: 150 LBS | HEIGHT: 59 IN | BODY MASS INDEX: 30.24 KG/M2

## 2025-07-15 DIAGNOSIS — Z12.31 SCREENING MAMMOGRAM FOR BREAST CANCER: ICD-10-CM

## 2025-07-15 PROCEDURE — 77063 BREAST TOMOSYNTHESIS BI: CPT | Performed by: STUDENT IN AN ORGANIZED HEALTH CARE EDUCATION/TRAINING PROGRAM

## 2025-07-15 PROCEDURE — 77067 SCR MAMMO BI INCL CAD: CPT | Performed by: STUDENT IN AN ORGANIZED HEALTH CARE EDUCATION/TRAINING PROGRAM

## 2025-07-15 PROCEDURE — 77067 SCR MAMMO BI INCL CAD: CPT

## 2025-07-22 ENCOUNTER — OFFICE VISIT (OUTPATIENT)
Dept: SURGERY | Facility: CLINIC | Age: 70
End: 2025-07-22
Payer: MEDICARE

## 2025-07-22 VITALS
DIASTOLIC BLOOD PRESSURE: 82 MMHG | TEMPERATURE: 97.9 F | HEART RATE: 65 BPM | HEIGHT: 59 IN | BODY MASS INDEX: 30.24 KG/M2 | SYSTOLIC BLOOD PRESSURE: 122 MMHG | WEIGHT: 150 LBS

## 2025-07-22 DIAGNOSIS — K64.8 BLEEDING INTERNAL HEMORRHOIDS: Primary | ICD-10-CM

## 2025-07-22 DIAGNOSIS — R19.7 DIARRHEA, UNSPECIFIED TYPE: ICD-10-CM

## 2025-07-22 PROCEDURE — 4010F ACE/ARB THERAPY RXD/TAKEN: CPT | Performed by: NURSE PRACTITIONER

## 2025-07-22 PROCEDURE — 3008F BODY MASS INDEX DOCD: CPT | Performed by: NURSE PRACTITIONER

## 2025-07-22 PROCEDURE — 99203 OFFICE O/P NEW LOW 30 MIN: CPT | Performed by: NURSE PRACTITIONER

## 2025-07-22 PROCEDURE — 99213 OFFICE O/P EST LOW 20 MIN: CPT | Mod: 25 | Performed by: NURSE PRACTITIONER

## 2025-07-22 PROCEDURE — 3074F SYST BP LT 130 MM HG: CPT | Performed by: NURSE PRACTITIONER

## 2025-07-22 PROCEDURE — 3079F DIAST BP 80-89 MM HG: CPT | Performed by: NURSE PRACTITIONER

## 2025-07-22 PROCEDURE — 46600 DIAGNOSTIC ANOSCOPY SPX: CPT | Performed by: NURSE PRACTITIONER

## 2025-07-22 PROCEDURE — 1159F MED LIST DOCD IN RCRD: CPT | Performed by: NURSE PRACTITIONER

## 2025-07-22 RX ORDER — HYDROCORTISONE ACETATE 25 MG/1
25 SUPPOSITORY RECTAL 2 TIMES DAILY
Qty: 28 SUPPOSITORY | Refills: 0 | Status: SHIPPED | OUTPATIENT
Start: 2025-07-22 | End: 2025-08-05

## 2025-07-22 NOTE — PROGRESS NOTES
History Of Present Illness  Kisha Vincent is a 69 y.o. female presenting with hemorrhoidal issues.    Colonoscopy 24 no polyps    She has been having bleeding hemorrhoids off and on for the past month.  She can feel them at times after a BM.  She will have anal discomfort at times.  She has chronic diarrhea and will have 4-5 BM's every day.  She will take Imodium 2x/week.  She does not take a fiber supplement.  She will have a little leakage of stool at times.  No hx of any perianal surgeries.  No vaginal births.       Past Medical History  She has a past medical history of Abdominal distension (gaseous) (2020), Arthritis (?), Chronic diarrhea, Chronic kidney disease (), Cirrhosis (Multi), Colon polyp, COPD (chronic obstructive pulmonary disease) (Multi), Diabetes mellitus (Multi) (), Diverticulitis of colon, Diverticulosis, Hypertension (?), Lung disease, Osteopetrosis (HHS-HCC), Personal history of other diseases of the nervous system and sense organs, Personal history of other diseases of the nervous system and sense organs, Personal history of other endocrine, nutritional and metabolic disease, Personal history of other endocrine, nutritional and metabolic disease, Personal history of other infectious and parasitic diseases, and Thrombocytopenia, unspecified.    Surgical History  She has a past surgical history that includes  section, classic (02/15/2018); Tubal ligation (02/15/2018); Cataract extraction (02/15/2018); Other surgical history (2020); US guided percutaneous placement (8/10/2021); and  section, low transverse (1982, 1985, 1992).     Social History  She reports that she quit smoking about 11 years ago. Her smoking use included cigarettes. She has a 63 pack-year smoking history. She has never used smokeless tobacco. She reports that she does not currently use alcohol. She reports that she does not use drugs.    Family History  Family History[1]      Allergies  Latex, natural rubber; Adhesive tape-silicones; Ampicillin; and Cefazolin    Review of Systems   All other systems reviewed and are negative.       Physical Exam  Exam conducted with a chaperone present.   Constitutional:       Appearance: Normal appearance.   HENT:      Head: Normocephalic and atraumatic.   Pulmonary:      Effort: Pulmonary effort is normal.     Musculoskeletal:         General: Normal range of motion.     Skin:     General: Skin is warm and dry.     Neurological:      General: No focal deficit present.      Mental Status: She is alert and oriented to person, place, and time.     Psychiatric:         Mood and Affect: Mood normal.         Behavior: Behavior normal.         Anoscopy    Date/Time: 7/22/2025 10:40 AM    Performed by: CARRIE Gonzales  Authorized by: CARRIE Gonzales    Consent:     Consent obtained:  Verbal    Consent given by:  Patient    Risks, benefits, and alternatives were discussed: yes    Universal protocol:     Procedure explained and questions answered to patient or proxy's satisfaction: yes      Patient identity confirmed:  Verbally with patient  Post-procedure details:     Procedure completion:  Tolerated  Comments:      No inflamed external hemorrhoids.  Slight weak tone on WINIFRED with discomfort.  On anoscopy, looking in 360 degrees, she has moderate prolapsing inflamed internal hemorrhoids.  No active bleeding, just discomfort.    Last Recorded Vitals  /82   Pulse 65   Temp 36.6 °C (97.9 °F)   Wt 68 kg (150 lb)      Assessment/Plan   Kisha has moderate prolapsing and irritated internal hemorrhoids and diarrhea.  She will start using Hydrocortisone suppositories BID for 2 weeks.  She will start taking an Imodium every other day and Benefiber daily to help bulk up her stools.  She will call me in a few few weeks if her stools are not more bulked.  She will follow up in 6 weeks if needed.         CARRIE Gonzales       [1]    Family History  Problem Relation Name Age of Onset    Diabetes Mother      Breast cancer Mother  80    Hypertension Father      Lung cancer Father      Thyroid disease Maternal Grandmother      Diabetes Paternal Grandmother      Breast cancer Paternal Grandmother  60    Asthma Mother      Arthritis Mother      Heart disease Mother      COPD Father      Cancer Father      Vision loss Father      Cancer Paternal Grandmother      Stomach cancer Paternal Grandmother      Cancer Father's Brother      Stomach cancer Father's Brother      COPD Brother Abhishek Bhat Jr.

## 2025-08-07 ENCOUNTER — PATIENT MESSAGE (OUTPATIENT)
Dept: PRIMARY CARE | Facility: CLINIC | Age: 70
End: 2025-08-07
Payer: MEDICARE

## 2025-08-07 DIAGNOSIS — K46.9 ABDOMINAL HERNIA WITHOUT OBSTRUCTION AND WITHOUT GANGRENE, RECURRENCE NOT SPECIFIED, UNSPECIFIED HERNIA TYPE: Primary | ICD-10-CM

## 2025-08-08 ENCOUNTER — LAB (OUTPATIENT)
Dept: LAB | Facility: CLINIC | Age: 70
End: 2025-08-08
Payer: MEDICARE

## 2025-08-08 DIAGNOSIS — D50.9 IRON DEFICIENCY ANEMIA, UNSPECIFIED IRON DEFICIENCY ANEMIA TYPE: ICD-10-CM

## 2025-08-08 DIAGNOSIS — D61.818 PANCYTOPENIA: ICD-10-CM

## 2025-08-08 DIAGNOSIS — K74.60 CIRRHOSIS OF LIVER WITHOUT ASCITES, UNSPECIFIED HEPATIC CIRRHOSIS TYPE (MULTI): ICD-10-CM

## 2025-08-08 LAB
ALBUMIN SERPL BCP-MCNC: 4.1 G/DL (ref 3.4–5)
ALP SERPL-CCNC: 124 U/L (ref 33–136)
ALT SERPL W P-5'-P-CCNC: 31 U/L (ref 7–45)
ANION GAP SERPL CALC-SCNC: 11 MMOL/L (ref 10–20)
AST SERPL W P-5'-P-CCNC: 22 U/L (ref 9–39)
BASOPHILS # BLD AUTO: 0.02 X10*3/UL (ref 0–0.1)
BASOPHILS NFR BLD AUTO: 0.6 %
BILIRUB SERPL-MCNC: 0.7 MG/DL (ref 0–1.2)
BUN SERPL-MCNC: 25 MG/DL (ref 6–23)
CALCIUM SERPL-MCNC: 9.6 MG/DL (ref 8.6–10.3)
CHLORIDE SERPL-SCNC: 110 MMOL/L (ref 98–107)
CO2 SERPL-SCNC: 24 MMOL/L (ref 21–32)
CREAT SERPL-MCNC: 1.25 MG/DL (ref 0.5–1.05)
EGFRCR SERPLBLD CKD-EPI 2021: 47 ML/MIN/1.73M*2
EOSINOPHIL # BLD AUTO: 0.14 X10*3/UL (ref 0–0.7)
EOSINOPHIL NFR BLD AUTO: 3.9 %
ERYTHROCYTE [DISTWIDTH] IN BLOOD BY AUTOMATED COUNT: 14.6 % (ref 11.5–14.5)
FERRITIN SERPL-MCNC: 70 NG/ML (ref 8–150)
GIANT PLATELETS BLD QL SMEAR: NORMAL
GLUCOSE SERPL-MCNC: 143 MG/DL (ref 74–99)
HCT VFR BLD AUTO: 34.3 % (ref 36–46)
HGB BLD-MCNC: 11 G/DL (ref 12–16)
IMM GRANULOCYTES # BLD AUTO: 0.01 X10*3/UL (ref 0–0.7)
IMM GRANULOCYTES NFR BLD AUTO: 0.3 % (ref 0–0.9)
IRON SATN MFR SERPL: 15 % (ref 25–45)
IRON SERPL-MCNC: 53 UG/DL (ref 35–150)
LYMPHOCYTES # BLD AUTO: 0.66 X10*3/UL (ref 1.2–4.8)
LYMPHOCYTES NFR BLD AUTO: 18.4 %
MCH RBC QN AUTO: 28.8 PG (ref 26–34)
MCHC RBC AUTO-ENTMCNC: 32.1 G/DL (ref 32–36)
MCV RBC AUTO: 90 FL (ref 80–100)
MONOCYTES # BLD AUTO: 0.28 X10*3/UL (ref 0.1–1)
MONOCYTES NFR BLD AUTO: 7.8 %
NEUTROPHILS # BLD AUTO: 2.47 X10*3/UL (ref 1.2–7.7)
NEUTROPHILS NFR BLD AUTO: 69 %
NRBC BLD-RTO: 0 /100 WBCS (ref 0–0)
PLATELET # BLD AUTO: 56 X10*3/UL (ref 150–450)
POTASSIUM SERPL-SCNC: 4.7 MMOL/L (ref 3.5–5.3)
PROT SERPL-MCNC: 6.5 G/DL (ref 6.4–8.2)
RBC # BLD AUTO: 3.82 X10*6/UL (ref 4–5.2)
RBC MORPH BLD: NORMAL
SODIUM SERPL-SCNC: 140 MMOL/L (ref 136–145)
TIBC SERPL-MCNC: 357 UG/DL (ref 240–445)
UIBC SERPL-MCNC: 304 UG/DL (ref 110–370)
WBC # BLD AUTO: 3.6 X10*3/UL (ref 4.4–11.3)

## 2025-08-08 PROCEDURE — 85025 COMPLETE CBC W/AUTO DIFF WBC: CPT

## 2025-08-08 PROCEDURE — 83540 ASSAY OF IRON: CPT

## 2025-08-08 PROCEDURE — 82728 ASSAY OF FERRITIN: CPT

## 2025-08-08 PROCEDURE — 36415 COLL VENOUS BLD VENIPUNCTURE: CPT

## 2025-08-08 PROCEDURE — 80053 COMPREHEN METABOLIC PANEL: CPT

## 2025-08-11 PROBLEM — R07.9 CHEST PAIN: Status: RESOLVED | Noted: 2023-09-21 | Resolved: 2025-08-11

## 2025-08-11 PROBLEM — Z86.19 HISTORY OF SEPSIS: Status: ACTIVE | Noted: 2025-08-11

## 2025-08-11 PROBLEM — I69.398 OTHER SEQUELAE OF CEREBRAL INFARCTION: Status: RESOLVED | Noted: 2023-09-21 | Resolved: 2025-08-11

## 2025-08-11 PROBLEM — J32.9 SINUSITIS: Status: ACTIVE | Noted: 2025-08-11

## 2025-08-11 PROBLEM — F32.A DEPRESSIVE DISORDER: Status: ACTIVE | Noted: 2025-08-11

## 2025-08-11 PROBLEM — D50.9 IRON DEFICIENCY ANEMIA: Status: ACTIVE | Noted: 2025-08-11

## 2025-08-11 PROBLEM — E55.9 VITAMIN D DEFICIENCY: Status: RESOLVED | Noted: 2023-09-21 | Resolved: 2025-08-11

## 2025-08-11 PROBLEM — J18.9 PNEUMONIA: Status: ACTIVE | Noted: 2025-08-11

## 2025-08-11 PROBLEM — R40.1 CLOUDED CONSCIOUSNESS: Status: RESOLVED | Noted: 2023-09-21 | Resolved: 2025-08-11

## 2025-08-11 PROBLEM — Z91.89 AT RISK FOR INJURY RELATED TO RESTRAINTS: Status: RESOLVED | Noted: 2023-09-21 | Resolved: 2025-08-11

## 2025-08-13 ENCOUNTER — TELEPHONE (OUTPATIENT)
Dept: SURGERY | Facility: CLINIC | Age: 70
End: 2025-08-13
Payer: MEDICARE

## 2025-08-18 ENCOUNTER — APPOINTMENT (OUTPATIENT)
Dept: SURGERY | Facility: CLINIC | Age: 70
End: 2025-08-18
Payer: MEDICARE

## 2025-08-20 ENCOUNTER — TELEPHONE (OUTPATIENT)
Dept: SURGERY | Facility: CLINIC | Age: 70
End: 2025-08-20
Payer: MEDICARE

## 2025-08-21 ENCOUNTER — OFFICE VISIT (OUTPATIENT)
Dept: SURGERY | Facility: CLINIC | Age: 70
End: 2025-08-21
Payer: MEDICARE

## 2025-08-21 VITALS — WEIGHT: 150 LBS | HEIGHT: 59 IN | BODY MASS INDEX: 30.24 KG/M2

## 2025-08-21 DIAGNOSIS — K44.9 HIATAL HERNIA: Primary | ICD-10-CM

## 2025-08-21 LAB
AFP-TM SERPL-MCNC: 2.5 NG/ML
ALBUMIN SERPL-MCNC: 4.2 G/DL (ref 3.6–5.1)
ALP SERPL-CCNC: 118 U/L (ref 37–153)
ALT SERPL-CCNC: 29 U/L (ref 6–29)
ANION GAP SERPL CALCULATED.4IONS-SCNC: 7 MMOL/L (CALC) (ref 7–17)
AST SERPL-CCNC: 24 U/L (ref 10–35)
BASOPHILS # BLD AUTO: 19 CELLS/UL (ref 0–200)
BASOPHILS NFR BLD AUTO: 0.6 %
BILIRUB SERPL-MCNC: 0.9 MG/DL (ref 0.2–1.2)
BUN SERPL-MCNC: 21 MG/DL (ref 7–25)
CALCIUM SERPL-MCNC: 9.3 MG/DL (ref 8.6–10.4)
CHLORIDE SERPL-SCNC: 107 MMOL/L (ref 98–110)
CO2 SERPL-SCNC: 25 MMOL/L (ref 20–32)
CREAT SERPL-MCNC: 1.44 MG/DL (ref 0.5–1.05)
EGFRCR SERPLBLD CKD-EPI 2021: 39 ML/MIN/1.73M2
EOSINOPHIL # BLD AUTO: 99 CELLS/UL (ref 15–500)
EOSINOPHIL NFR BLD AUTO: 3.2 %
ERYTHROCYTE [DISTWIDTH] IN BLOOD BY AUTOMATED COUNT: 13.8 % (ref 11–15)
GLUCOSE SERPL-MCNC: 134 MG/DL (ref 65–99)
HCT VFR BLD AUTO: 35.1 % (ref 35–45)
HGB BLD-MCNC: 11.1 G/DL (ref 11.7–15.5)
INR PPP: 1
LYMPHOCYTES # BLD AUTO: 552 CELLS/UL (ref 850–3900)
LYMPHOCYTES NFR BLD AUTO: 17.8 %
MCH RBC QN AUTO: 28.8 PG (ref 27–33)
MCHC RBC AUTO-ENTMCNC: 31.6 G/DL (ref 32–36)
MCV RBC AUTO: 90.9 FL (ref 80–100)
MONOCYTES # BLD AUTO: 208 CELLS/UL (ref 200–950)
MONOCYTES NFR BLD AUTO: 6.7 %
NEUTROPHILS # BLD AUTO: 2223 CELLS/UL (ref 1500–7800)
NEUTROPHILS NFR BLD AUTO: 71.7 %
PLATELET # BLD AUTO: 49 THOUSAND/UL (ref 140–400)
PMV BLD REES-ECKER: 11.2 FL (ref 7.5–12.5)
POTASSIUM SERPL-SCNC: 5.3 MMOL/L (ref 3.5–5.3)
PROT SERPL-MCNC: 6.6 G/DL (ref 6.1–8.1)
PROTHROMBIN TIME: 10.7 SEC (ref 9–11.5)
RBC # BLD AUTO: 3.86 MILLION/UL (ref 3.8–5.1)
SERVICE CMNT-IMP: ABNORMAL
SODIUM SERPL-SCNC: 139 MMOL/L (ref 135–146)
WBC # BLD AUTO: 3.1 THOUSAND/UL (ref 3.8–10.8)

## 2025-08-21 PROCEDURE — 1159F MED LIST DOCD IN RCRD: CPT | Performed by: SURGERY

## 2025-08-21 PROCEDURE — 99203 OFFICE O/P NEW LOW 30 MIN: CPT | Performed by: SURGERY

## 2025-08-21 PROCEDURE — 4010F ACE/ARB THERAPY RXD/TAKEN: CPT | Performed by: SURGERY

## 2025-08-21 PROCEDURE — 3008F BODY MASS INDEX DOCD: CPT | Performed by: SURGERY

## 2025-08-21 PROCEDURE — 99213 OFFICE O/P EST LOW 20 MIN: CPT | Performed by: SURGERY

## 2025-08-21 ASSESSMENT — ENCOUNTER SYMPTOMS
APPETITE CHANGE: 0
CHILLS: 0
SHORTNESS OF BREATH: 0
NAUSEA: 0
DIZZINESS: 0
DIAPHORESIS: 0
CONFUSION: 0
ABDOMINAL DISTENTION: 1
AGITATION: 0
ACTIVITY CHANGE: 0
FEVER: 0
VOMITING: 0

## 2025-08-22 DIAGNOSIS — E03.9 HYPOTHYROIDISM, UNSPECIFIED TYPE: ICD-10-CM

## 2025-08-22 RX ORDER — ATORVASTATIN CALCIUM 40 MG/1
40 TABLET, FILM COATED ORAL DAILY
Qty: 90 TABLET | Refills: 2 | Status: SHIPPED | OUTPATIENT
Start: 2025-08-22

## 2025-08-25 ENCOUNTER — ANESTHESIA EVENT (OUTPATIENT)
Dept: GASTROENTEROLOGY | Facility: HOSPITAL | Age: 70
End: 2025-08-25
Payer: MEDICARE

## 2025-08-26 ENCOUNTER — HOSPITAL ENCOUNTER (OUTPATIENT)
Dept: GASTROENTEROLOGY | Facility: HOSPITAL | Age: 70
Discharge: HOME | End: 2025-08-26
Payer: MEDICARE

## 2025-08-26 ENCOUNTER — ANESTHESIA (OUTPATIENT)
Dept: GASTROENTEROLOGY | Facility: HOSPITAL | Age: 70
End: 2025-08-26
Payer: MEDICARE

## 2025-08-26 VITALS
HEIGHT: 59 IN | SYSTOLIC BLOOD PRESSURE: 130 MMHG | RESPIRATION RATE: 18 BRPM | TEMPERATURE: 98.4 F | WEIGHT: 150 LBS | OXYGEN SATURATION: 96 % | BODY MASS INDEX: 30.24 KG/M2 | HEART RATE: 77 BPM | DIASTOLIC BLOOD PRESSURE: 69 MMHG

## 2025-08-26 DIAGNOSIS — K76.0 METABOLIC DYSFUNCTION-ASSOCIATED STEATOTIC LIVER DISEASE (MASLD): ICD-10-CM

## 2025-08-26 DIAGNOSIS — K74.60 ESOPHAGEAL VARICES IN CIRRHOSIS (MULTI): ICD-10-CM

## 2025-08-26 DIAGNOSIS — K74.69 OTHER CIRRHOSIS OF LIVER: ICD-10-CM

## 2025-08-26 DIAGNOSIS — I85.10 ESOPHAGEAL VARICES IN CIRRHOSIS (MULTI): ICD-10-CM

## 2025-08-26 LAB — GLUCOSE BLD MANUAL STRIP-MCNC: 112 MG/DL (ref 74–99)

## 2025-08-26 PROCEDURE — 43244 EGD VARICES LIGATION: CPT | Performed by: INTERNAL MEDICINE

## 2025-08-26 PROCEDURE — 3700000002 HC GENERAL ANESTHESIA TIME - EACH INCREMENTAL 1 MINUTE

## 2025-08-26 PROCEDURE — 82947 ASSAY GLUCOSE BLOOD QUANT: CPT

## 2025-08-26 PROCEDURE — 2500000004 HC RX 250 GENERAL PHARMACY W/ HCPCS (ALT 636 FOR OP/ED)

## 2025-08-26 PROCEDURE — 3700000001 HC GENERAL ANESTHESIA TIME - INITIAL BASE CHARGE

## 2025-08-26 PROCEDURE — 7100000009 HC PHASE TWO TIME - INITIAL BASE CHARGE

## 2025-08-26 PROCEDURE — A43244 PR EDG BAND LIGATION ESOPHGEAL/GASTRIC VARICES: Performed by: ANESTHESIOLOGY

## 2025-08-26 PROCEDURE — 2720000007 HC OR 272 NO HCPCS

## 2025-08-26 PROCEDURE — A43244 PR EDG BAND LIGATION ESOPHGEAL/GASTRIC VARICES

## 2025-08-26 PROCEDURE — 7100000010 HC PHASE TWO TIME - EACH INCREMENTAL 1 MINUTE

## 2025-08-26 RX ORDER — ACETAMINOPHEN 325 MG/1
650 TABLET ORAL EVERY 4 HOURS PRN
OUTPATIENT
Start: 2025-08-26

## 2025-08-26 RX ORDER — PROPOFOL 10 MG/ML
INJECTION, EMULSION INTRAVENOUS CONTINUOUS PRN
Status: DISCONTINUED | OUTPATIENT
Start: 2025-08-26 | End: 2025-08-26

## 2025-08-26 RX ORDER — ONDANSETRON HYDROCHLORIDE 2 MG/ML
4 INJECTION, SOLUTION INTRAVENOUS ONCE AS NEEDED
OUTPATIENT
Start: 2025-08-26

## 2025-08-26 RX ORDER — SODIUM CHLORIDE, SODIUM LACTATE, POTASSIUM CHLORIDE, CALCIUM CHLORIDE 600; 310; 30; 20 MG/100ML; MG/100ML; MG/100ML; MG/100ML
100 INJECTION, SOLUTION INTRAVENOUS CONTINUOUS
OUTPATIENT
Start: 2025-08-26 | End: 2025-08-27

## 2025-08-26 RX ORDER — FENTANYL CITRATE 50 UG/ML
INJECTION, SOLUTION INTRAMUSCULAR; INTRAVENOUS AS NEEDED
Status: DISCONTINUED | OUTPATIENT
Start: 2025-08-26 | End: 2025-08-26

## 2025-08-26 RX ORDER — SODIUM CHLORIDE, SODIUM LACTATE, POTASSIUM CHLORIDE, CALCIUM CHLORIDE 600; 310; 30; 20 MG/100ML; MG/100ML; MG/100ML; MG/100ML
INJECTION, SOLUTION INTRAVENOUS CONTINUOUS PRN
Status: DISCONTINUED | OUTPATIENT
Start: 2025-08-26 | End: 2025-08-26

## 2025-08-26 RX ORDER — LIDOCAINE IN NACL,ISO-OSMOT/PF 30 MG/3 ML
0.1 SYRINGE (ML) INJECTION ONCE
OUTPATIENT
Start: 2025-08-26 | End: 2025-08-26

## 2025-08-26 RX ADMIN — PROPOFOL 400 MCG/KG/MIN: 10 INJECTION, EMULSION INTRAVENOUS at 13:20

## 2025-08-26 RX ADMIN — FENTANYL CITRATE 100 MCG: 50 INJECTION, SOLUTION INTRAMUSCULAR; INTRAVENOUS at 13:16

## 2025-08-26 RX ADMIN — SODIUM CHLORIDE, POTASSIUM CHLORIDE, SODIUM LACTATE AND CALCIUM CHLORIDE: 600; 310; 30; 20 INJECTION, SOLUTION INTRAVENOUS at 13:16

## 2025-08-26 ASSESSMENT — PAIN - FUNCTIONAL ASSESSMENT
PAIN_FUNCTIONAL_ASSESSMENT: 0-10

## 2025-08-26 ASSESSMENT — PAIN SCALES - GENERAL
PAINLEVEL_OUTOF10: 0 - NO PAIN

## 2025-09-04 ENCOUNTER — PATIENT MESSAGE (OUTPATIENT)
Dept: PRIMARY CARE | Facility: CLINIC | Age: 70
End: 2025-09-04
Payer: MEDICARE

## 2025-09-04 DIAGNOSIS — R06.09 DYSPNEA ON MINIMAL EXERTION: ICD-10-CM

## 2025-09-04 RX ORDER — ALBUTEROL SULFATE 90 UG/1
1 INHALANT RESPIRATORY (INHALATION) EVERY 4 HOURS PRN
Qty: 18 G | Refills: 2 | Status: SHIPPED | OUTPATIENT
Start: 2025-09-04

## 2025-10-07 ENCOUNTER — APPOINTMENT (OUTPATIENT)
Dept: PRIMARY CARE | Facility: CLINIC | Age: 70
End: 2025-10-07
Payer: MEDICARE

## 2026-01-29 ENCOUNTER — APPOINTMENT (OUTPATIENT)
Dept: HEMATOLOGY/ONCOLOGY | Facility: HOSPITAL | Age: 71
End: 2026-01-29
Payer: MEDICARE